# Patient Record
Sex: MALE | Race: BLACK OR AFRICAN AMERICAN | NOT HISPANIC OR LATINO | Employment: UNEMPLOYED | URBAN - METROPOLITAN AREA
[De-identification: names, ages, dates, MRNs, and addresses within clinical notes are randomized per-mention and may not be internally consistent; named-entity substitution may affect disease eponyms.]

---

## 2018-07-03 ENCOUNTER — HOSPITAL ENCOUNTER (EMERGENCY)
Facility: OTHER | Age: 33
Discharge: HOME | End: 2018-07-03
Attending: EMERGENCY MEDICINE
Payer: MEDICAID

## 2018-07-03 VITALS
HEART RATE: 63 BPM | HEIGHT: 73 IN | TEMPERATURE: 98 F | WEIGHT: 150 LBS | RESPIRATION RATE: 18 BRPM | OXYGEN SATURATION: 100 % | DIASTOLIC BLOOD PRESSURE: 62 MMHG | BODY MASS INDEX: 19.88 KG/M2 | SYSTOLIC BLOOD PRESSURE: 113 MMHG

## 2018-07-03 DIAGNOSIS — R07.9 CHEST PAIN: Primary | ICD-10-CM

## 2018-07-03 LAB
ALBUMIN SERPL BCP-MCNC: 4.5 G/DL
ALP SERPL-CCNC: 39 U/L
ALT SERPL W/O P-5'-P-CCNC: 15 U/L
ANION GAP SERPL CALC-SCNC: 8 MMOL/L
ANISOCYTOSIS BLD QL SMEAR: SLIGHT
AST SERPL-CCNC: 16 U/L
BASOPHILS # BLD AUTO: 0.04 K/UL
BASOPHILS NFR BLD: 0.7 %
BILIRUB SERPL-MCNC: 0.6 MG/DL
BUN SERPL-MCNC: 15 MG/DL
CALCIUM SERPL-MCNC: 9.8 MG/DL
CHLORIDE SERPL-SCNC: 106 MMOL/L
CO2 SERPL-SCNC: 27 MMOL/L
CREAT SERPL-MCNC: 1.3 MG/DL
DACRYOCYTES BLD QL SMEAR: ABNORMAL
DIFFERENTIAL METHOD: ABNORMAL
EOSINOPHIL # BLD AUTO: 0.3 K/UL
EOSINOPHIL NFR BLD: 6.2 %
ERYTHROCYTE [DISTWIDTH] IN BLOOD BY AUTOMATED COUNT: 15.1 %
EST. GFR  (AFRICAN AMERICAN): >60 ML/MIN/1.73 M^2
EST. GFR  (NON AFRICAN AMERICAN): >60 ML/MIN/1.73 M^2
GIANT PLATELETS BLD QL SMEAR: PRESENT
GLUCOSE SERPL-MCNC: 106 MG/DL
HCT VFR BLD AUTO: 42.4 %
HGB BLD-MCNC: 13.4 G/DL
LYMPHOCYTES # BLD AUTO: 1.3 K/UL
LYMPHOCYTES NFR BLD: 22.8 %
MCH RBC QN AUTO: 22.9 PG
MCHC RBC AUTO-ENTMCNC: 31.6 G/DL
MCV RBC AUTO: 73 FL
MONOCYTES # BLD AUTO: 0.4 K/UL
MONOCYTES NFR BLD: 6.8 %
NEUTROPHILS # BLD AUTO: 3.5 K/UL
NEUTROPHILS NFR BLD: 63.5 %
PLATELET # BLD AUTO: 137 K/UL
PMV BLD AUTO: ABNORMAL FL
POTASSIUM SERPL-SCNC: 4.5 MMOL/L
PROT SERPL-MCNC: 7.5 G/DL
RBC # BLD AUTO: 5.85 M/UL
SODIUM SERPL-SCNC: 141 MMOL/L
TROPONIN I SERPL DL<=0.01 NG/ML-MCNC: <0.006 NG/ML
WBC # BLD AUTO: 5.48 K/UL

## 2018-07-03 PROCEDURE — 99284 EMERGENCY DEPT VISIT MOD MDM: CPT | Mod: 25

## 2018-07-03 PROCEDURE — 93005 ELECTROCARDIOGRAM TRACING: CPT

## 2018-07-03 PROCEDURE — 93010 ELECTROCARDIOGRAM REPORT: CPT | Mod: ,,, | Performed by: INTERNAL MEDICINE

## 2018-07-03 PROCEDURE — 84484 ASSAY OF TROPONIN QUANT: CPT

## 2018-07-03 PROCEDURE — 25000003 PHARM REV CODE 250: Performed by: PHYSICIAN ASSISTANT

## 2018-07-03 PROCEDURE — 85025 COMPLETE CBC W/AUTO DIFF WBC: CPT

## 2018-07-03 PROCEDURE — 80053 COMPREHEN METABOLIC PANEL: CPT

## 2018-07-03 PROCEDURE — 93010 ELECTROCARDIOGRAM REPORT: CPT | Mod: 59,,, | Performed by: INTERNAL MEDICINE

## 2018-07-03 RX ORDER — NAPROXEN 500 MG/1
500 TABLET ORAL 2 TIMES DAILY WITH MEALS
Qty: 20 TABLET | Refills: 0 | Status: SHIPPED | OUTPATIENT
Start: 2018-07-03 | End: 2020-03-09

## 2018-07-03 RX ORDER — NAPROXEN 500 MG/1
500 TABLET ORAL
Status: COMPLETED | OUTPATIENT
Start: 2018-07-03 | End: 2018-07-03

## 2018-07-03 RX ADMIN — NAPROXEN 500 MG: 500 TABLET ORAL at 03:07

## 2018-07-03 NOTE — ED PROVIDER NOTES
"Encounter Date: 7/3/2018       History     Chief Complaint   Patient presents with    Muscle Pain     "I think I might be having a muscle spasm. It's very nik on the side and into my L shoulder." denies n/v, sob     The patient is a 33-year-old male presenting to the emergency department with complaints of left-sided chest pain. The patient states her symptoms started approximately 12:30 p.m..  He states that he had been napping, and went to the get up.  He states he developed a pain in his left chest.  He denies significant shortness of breath, nausea or vomiting.  He reports that he has had this pain in the past, but is never lasted as long.  He states that approximately 3 hr later to starting to improve.  He also reports significant improvement of his symptoms when he lays down.  He denies taking any medication for symptoms thus far.  He denies a history of hypertension, diabetes, tobacco use, or early family cardiac history.  He does admit that he is stressed out, she has been out of work for the past 3 months. This is the extent of the patient's complaints at this time.         The history is provided by the patient.     Review of patient's allergies indicates:  No Known Allergies  Past Medical History:   Diagnosis Date    Asthma      Past Surgical History:   Procedure Laterality Date    TESTICLE SURGERY       History reviewed. No pertinent family history.  Social History   Substance Use Topics    Smoking status: Never Smoker    Smokeless tobacco: Not on file    Alcohol use Yes      Comment: occasionally     Review of Systems   Constitutional: Negative for activity change, chills, fatigue and fever.   HENT: Negative for congestion, rhinorrhea and sore throat.    Eyes: Negative for photophobia and visual disturbance.   Respiratory: Negative for cough.    Cardiovascular: Positive for chest pain.   Gastrointestinal: Negative for abdominal pain, diarrhea, nausea and vomiting.   Genitourinary: Negative for " dysuria, hematuria and urgency.   Musculoskeletal: Negative for back pain, myalgias and neck pain.   Skin: Negative for color change and wound.   Neurological: Negative for weakness and headaches.   Psychiatric/Behavioral: Negative for agitation and confusion.       Physical Exam     Initial Vitals [07/03/18 1438]   BP Pulse Resp Temp SpO2   132/67 76 16 97.4 °F (36.3 °C) 100 %      MAP       --         Physical Exam    Nursing note and vitals reviewed.  Constitutional: Vital signs are normal. He appears well-developed and well-nourished. He is not diaphoretic.  Non-toxic appearance. He does not have a sickly appearance. He does not appear ill. No distress.   Well-appearing,  male accompanied by his family in the emergency department.  Speaking clearly in full sentences.  No acute distress. Ambulatory without difficulty.   HENT:   Head: Normocephalic and atraumatic.   Right Ear: External ear normal.   Left Ear: External ear normal.   Nose: Nose normal.   Mouth/Throat: Oropharynx is clear and moist.   Eyes: Conjunctivae and EOM are normal.   Neck: Normal range of motion. Neck supple.   Cardiovascular: Normal rate, regular rhythm and normal heart sounds.   Pulmonary/Chest: Breath sounds normal. No respiratory distress. He has no wheezes.   Musculoskeletal: Normal range of motion.   Neurological: He is alert and oriented to person, place, and time.   Skin: Skin is warm.   Psychiatric: He has a normal mood and affect. His behavior is normal. Judgment and thought content normal.         ED Course   Procedures  Labs Reviewed   CBC W/ AUTO DIFFERENTIAL - Abnormal; Notable for the following:        Result Value    Hemoglobin 13.4 (*)     MCV 73 (*)     MCH 22.9 (*)     MCHC 31.6 (*)     RDW 15.1 (*)     Platelets 137 (*)     All other components within normal limits   COMPREHENSIVE METABOLIC PANEL - Abnormal; Notable for the following:     Alkaline Phosphatase 39 (*)     All other components within normal  limits   TROPONIN I     EKG Readings: (Independently Interpreted)   Rhythm: Sinus Arrhythmia. Heart Rate: 62.       Imaging Results          X-Ray Chest PA And Lateral (Final result)  Result time 07/03/18 15:31:42    Final result by Homar Vivar MD (07/03/18 15:31:42)                 Impression:      No acute abnormality.      Electronically signed by: Homar Vivar MD  Date:    07/03/2018  Time:    15:31             Narrative:    EXAMINATION:  XR CHEST PA AND LATERAL    CLINICAL HISTORY:  Chest pain, unspecified    TECHNIQUE:  PA and lateral views of the chest were performed.    COMPARISON:  None    FINDINGS:  The lungs are clear, with normal appearance of pulmonary vasculature and no pleural effusion or pneumothorax.    The cardiomediastinal silhouette is normal.    Bones are intact.                              X-Rays:   Independently Interpreted Readings:   Chest X-Ray: Normal heart size.  No infiltrates.  No acute abnormalities.     Medical Decision Making:   Initial Assessment:   Urgent evaluation of a 33-year-old male presenting to the emergency department with complaints of left-sided chest pain. Patient is afebrile, nontoxic appearing, hemodynamically stable. Physical exam reveals regular rate and rhythm, lungs are clear to auscultation bilaterally. No reproducible tenderness to palpation of the anterior chest wall.  Will plan for labs, EKG, chest x-ray and reassess.  Independently Interpreted Test(s):   I have ordered and independently interpreted X-rays - see prior notes.  I have ordered and independently interpreted EKG Reading(s) - see prior notes  Clinical Tests:   Lab Tests: Ordered and Reviewed  The following lab test(s) were unremarkable: CBC, CMP and Troponin  Radiological Study: Ordered and Reviewed  Medical Tests: Ordered and Reviewed  ED Management:  The EKG shows normal sinus rhythm with a sinus arrhythmia, rate of 62 beats per minute, no STEMI.  Chest x-ray is unremarkable. The CBC shows no  leukocytosis, normal H&H.  CMP is unremarkable. Troponin is negative.  At this time, do not feel that further testing or imaging is warranted.  Patient does not have any signs or symptoms concerning for ACS, nor does he have any risk factors.  He has a negative troponin.  He reports his pain improved to a certain positions, admits had episodes in the past.  Normal vital signs with no EKG changes concerning for ischemia.  Discharged home with a prescription for naproxen, counseled on symptomatic care and treatment.  Stable for discharge. The patient was instructed to follow up with a primary care provider in 2 days or to return to the emergency department for worsening symptoms. The treatment plan was discussed with the patient who demonstrated understanding and comfort with plan. The patient's history, physical exam, and plan of care was discussed with and agreed upon with my supervising physician.    This note was created using M [employer] Fluency Direct. There may be typographical errors secondary to dictation.                       Clinical Impression:     1. Chest pain           Disposition:   Disposition: Discharged  Condition: Stable                       Cammy Cottrell PA-C  07/03/18 [id number]

## 2019-07-09 NOTE — ED TRIAGE NOTES
"Pt presents to the ED with c/o left anterior chest wall discomfort that started around 1 pm. Pt describes discomfort as "tightness". Pt states that when he is upset, he have "the same feeling". Pt states that he had tingling to left arm when cp occurred. Pt states his pain is now a 3/10. Pt denies interventions. Pt denies sob, cough, chills, fever, n/v/d, and dizziness. Pt is AAOX4. Pt in NAD.   " Bladder non-tender and non-distended. Urine clear yellow.

## 2019-10-02 ENCOUNTER — OFFICE VISIT (OUTPATIENT)
Dept: INTERNAL MEDICINE | Facility: CLINIC | Age: 34
End: 2019-10-02
Payer: COMMERCIAL

## 2019-10-02 ENCOUNTER — LAB VISIT (OUTPATIENT)
Dept: LAB | Facility: HOSPITAL | Age: 34
End: 2019-10-02
Payer: COMMERCIAL

## 2019-10-02 VITALS
HEIGHT: 73 IN | HEART RATE: 62 BPM | BODY MASS INDEX: 19.95 KG/M2 | OXYGEN SATURATION: 99 % | DIASTOLIC BLOOD PRESSURE: 76 MMHG | SYSTOLIC BLOOD PRESSURE: 108 MMHG | WEIGHT: 150.56 LBS

## 2019-10-02 DIAGNOSIS — Z00.00 ANNUAL PHYSICAL EXAM: Primary | ICD-10-CM

## 2019-10-02 DIAGNOSIS — Z00.00 ANNUAL PHYSICAL EXAM: ICD-10-CM

## 2019-10-02 LAB
25(OH)D3+25(OH)D2 SERPL-MCNC: 8 NG/ML (ref 30–96)
ALBUMIN SERPL BCP-MCNC: 4.3 G/DL (ref 3.5–5.2)
ALP SERPL-CCNC: 50 U/L (ref 55–135)
ALT SERPL W/O P-5'-P-CCNC: 11 U/L (ref 10–44)
ANION GAP SERPL CALC-SCNC: 7 MMOL/L (ref 8–16)
AST SERPL-CCNC: 12 U/L (ref 10–40)
BASOPHILS # BLD AUTO: 0.05 K/UL (ref 0–0.2)
BASOPHILS NFR BLD: 1 % (ref 0–1.9)
BILIRUB SERPL-MCNC: 0.6 MG/DL (ref 0.1–1)
BILIRUB UR QL STRIP: NEGATIVE
BUN SERPL-MCNC: 13 MG/DL (ref 6–20)
CALCIUM SERPL-MCNC: 9.4 MG/DL (ref 8.7–10.5)
CHLORIDE SERPL-SCNC: 105 MMOL/L (ref 95–110)
CHOLEST SERPL-MCNC: 121 MG/DL (ref 120–199)
CHOLEST/HDLC SERPL: 2.3 {RATIO} (ref 2–5)
CLARITY UR REFRACT.AUTO: CLEAR
CO2 SERPL-SCNC: 28 MMOL/L (ref 23–29)
COLOR UR AUTO: YELLOW
CREAT SERPL-MCNC: 1.2 MG/DL (ref 0.5–1.4)
DIFFERENTIAL METHOD: ABNORMAL
EOSINOPHIL # BLD AUTO: 0.4 K/UL (ref 0–0.5)
EOSINOPHIL NFR BLD: 6.8 % (ref 0–8)
ERYTHROCYTE [DISTWIDTH] IN BLOOD BY AUTOMATED COUNT: 16.2 % (ref 11.5–14.5)
EST. GFR  (AFRICAN AMERICAN): >60 ML/MIN/1.73 M^2
EST. GFR  (NON AFRICAN AMERICAN): >60 ML/MIN/1.73 M^2
GLUCOSE SERPL-MCNC: 97 MG/DL (ref 70–110)
GLUCOSE UR QL STRIP: NEGATIVE
HCT VFR BLD AUTO: 43.2 % (ref 40–54)
HDLC SERPL-MCNC: 52 MG/DL (ref 40–75)
HDLC SERPL: 43 % (ref 20–50)
HGB BLD-MCNC: 12.6 G/DL (ref 14–18)
HGB UR QL STRIP: NEGATIVE
KETONES UR QL STRIP: NEGATIVE
LDLC SERPL CALC-MCNC: 63.2 MG/DL (ref 63–159)
LEUKOCYTE ESTERASE UR QL STRIP: NEGATIVE
LYMPHOCYTES # BLD AUTO: 1.6 K/UL (ref 1–4.8)
LYMPHOCYTES NFR BLD: 30.5 % (ref 18–48)
MCH RBC QN AUTO: 22.7 PG (ref 27–31)
MCHC RBC AUTO-ENTMCNC: 29.2 G/DL (ref 32–36)
MCV RBC AUTO: 78 FL (ref 82–98)
MONOCYTES # BLD AUTO: 0.5 K/UL (ref 0.3–1)
MONOCYTES NFR BLD: 10.1 % (ref 4–15)
NEUTROPHILS # BLD AUTO: 2.6 K/UL (ref 1.8–7.7)
NEUTROPHILS NFR BLD: 51.2 % (ref 38–73)
NITRITE UR QL STRIP: NEGATIVE
NONHDLC SERPL-MCNC: 69 MG/DL
NRBC BLD-RTO: 0 /100 WBC
PH UR STRIP: 8 [PH] (ref 5–8)
PLATELET # BLD AUTO: 134 K/UL (ref 150–350)
PMV BLD AUTO: ABNORMAL FL (ref 9.2–12.9)
POTASSIUM SERPL-SCNC: 4 MMOL/L (ref 3.5–5.1)
PROT SERPL-MCNC: 7.2 G/DL (ref 6–8.4)
PROT UR QL STRIP: NEGATIVE
RBC # BLD AUTO: 5.56 M/UL (ref 4.6–6.2)
SODIUM SERPL-SCNC: 140 MMOL/L (ref 136–145)
SP GR UR STRIP: 1.02 (ref 1–1.03)
TRIGL SERPL-MCNC: 29 MG/DL (ref 30–150)
TSH SERPL DL<=0.005 MIU/L-ACNC: 0.75 UIU/ML (ref 0.4–4)
URN SPEC COLLECT METH UR: NORMAL
WBC # BLD AUTO: 5.15 K/UL (ref 3.9–12.7)

## 2019-10-02 PROCEDURE — 84439 ASSAY OF FREE THYROXINE: CPT

## 2019-10-02 PROCEDURE — 82306 VITAMIN D 25 HYDROXY: CPT

## 2019-10-02 PROCEDURE — 80061 LIPID PANEL: CPT

## 2019-10-02 PROCEDURE — 99385 PR PREVENTIVE VISIT,NEW,18-39: ICD-10-PCS | Mod: S$GLB,,, | Performed by: NURSE PRACTITIONER

## 2019-10-02 PROCEDURE — 84443 ASSAY THYROID STIM HORMONE: CPT

## 2019-10-02 PROCEDURE — 85025 COMPLETE CBC W/AUTO DIFF WBC: CPT

## 2019-10-02 PROCEDURE — 36415 COLL VENOUS BLD VENIPUNCTURE: CPT

## 2019-10-02 PROCEDURE — 99999 PR PBB SHADOW E&M-EST. PATIENT-LVL III: ICD-10-PCS | Mod: PBBFAC,,, | Performed by: NURSE PRACTITIONER

## 2019-10-02 PROCEDURE — 99385 PREV VISIT NEW AGE 18-39: CPT | Mod: S$GLB,,, | Performed by: NURSE PRACTITIONER

## 2019-10-02 PROCEDURE — 99999 PR PBB SHADOW E&M-EST. PATIENT-LVL III: CPT | Mod: PBBFAC,,, | Performed by: NURSE PRACTITIONER

## 2019-10-02 PROCEDURE — 81003 URINALYSIS AUTO W/O SCOPE: CPT

## 2019-10-02 PROCEDURE — 80053 COMPREHEN METABOLIC PANEL: CPT

## 2019-10-02 NOTE — PROGRESS NOTES
"Internal Medicine Annual Exam       CHIEF COMPLAINT     The patient, Nirmal Owens, who is a 34 y.o. male presents for an annual exam.    HPI   Here for annual physical.   Needs PCP     Reports "flikering" in the left peripheral vision x 3-5 minutes. Occurred 2-3 weeks ago after waking and while looking at phone. No lasting vision changes. Left eye watering occasionally. No formal eye exam but vision testing through employer normal 6 months ago   1 year ago - concrete debris to the left eye - rinses eye was not seen by medical provider      Also with change in sleep patterns. 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     HM-   Flu - refuses       Past Medical History:  Past Medical History:   Diagnosis Date    Asthma        Past Surgical History:   Procedure Laterality Date    TESTICLE SURGERY          No family history on file.     Social History     Socioeconomic History    Marital status: Single     Spouse name: Not on file    Number of children: Not on file    Years of education: Not on file    Highest education level: Not on file   Occupational History    Not on file   Social Needs    Financial resource strain: Not on file    Food insecurity:     Worry: Not on file     Inability: Not on file    Transportation needs:     Medical: Not on file     Non-medical: Not on file   Tobacco Use    Smoking status: Never Smoker   Substance and Sexual Activity    Alcohol use: Yes     Comment: occasionally    Drug use: No    Sexual activity: Not on file   Lifestyle    Physical activity:     Days per week: Not on file     Minutes per session: Not on file    Stress: Not on file   Relationships    Social connections:     Talks on phone: Not on file     Gets together: Not on file     Attends Christianity service: Not on file     Active member of club or organization: Not on file     Attends meetings of clubs or organizations: Not on file     Relationship status: Not on file   Other Topics Concern    Not on file   Social " "History Narrative    Not on file        Social History     Tobacco Use   Smoking Status Never Smoker        Allergies as of 10/02/2019    (No Known Allergies)          Home Medications:  Prior to Admission medications    Medication Sig Start Date End Date Taking? Authorizing Provider   fluticasone (FLONASE) 50 mcg/actuation nasal spray 1 spray by Each Nare route 2 (two) times daily as needed for Rhinitis. 11/20/16   SisiDONYA Oates   loratadine (CLARITIN) 10 mg tablet Take 1 tablet (10 mg total) by mouth once daily. 11/20/16 11/20/17  Sisi ZACKARY DolanP   naproxen (NAPROSYN) 500 MG tablet Take 1 tablet (500 mg total) by mouth 2 (two) times daily with meals. 7/3/18   Cammy Cottrell PA-C       Review of Systems:  Review of Systems   Constitutional: Positive for activity change and fatigue. Negative for chills, diaphoresis and fever.   HENT: Positive for postnasal drip. Negative for congestion, rhinorrhea, sinus pressure, sinus pain and sore throat.    Eyes: Positive for discharge. Negative for photophobia, pain, redness, itching and visual disturbance.   Respiratory: Negative for cough, shortness of breath and wheezing.    Cardiovascular: Negative for chest pain, palpitations and leg swelling.   Gastrointestinal: Negative for abdominal pain, constipation, diarrhea, nausea and vomiting.   Genitourinary: Negative for difficulty urinating and frequency.   Musculoskeletal: Negative for arthralgias and myalgias.   Neurological: Negative for dizziness, light-headedness and headaches.   Psychiatric/Behavioral: Positive for sleep disturbance.       Health Maintainence:   Immunizations:  Health Maintenance       Date Due Completion Date    Lipid Panel 1986 ---    TETANUS VACCINE 01/13/2004 ---    Influenza Vaccine (1) 09/01/2019 ---           PHYSICAL EXAM     /76 (BP Location: Right arm, Patient Position: Sitting, BP Method: Large (Manual))   Pulse 62   Ht 6' 1" (1.854 m)   Wt 68.3 kg " (150 lb 9.2 oz)   SpO2 99%   BMI 19.87 kg/m²  Body mass index is 19.87 kg/m².    Physical Exam   Constitutional: He is oriented to person, place, and time. He appears well-developed and well-nourished.   HENT:   Head: Normocephalic.   Right Ear: External ear normal.   Left Ear: External ear normal.   Nose: Nose normal.   Mouth/Throat: Oropharynx is clear and moist. No oropharyngeal exudate.   Eyes: Pupils are equal, round, and reactive to light. Conjunctivae, EOM and lids are normal. Right eye exhibits no chemosis, no discharge, no exudate and no hordeolum. No foreign body present in the right eye. Left eye exhibits no chemosis, no discharge, no exudate and no hordeolum. No foreign body present in the left eye. No scleral icterus.   Fundoscopic exam:       The right eye shows no exudate, no hemorrhage and no papilledema. The right eye shows red reflex.        The left eye shows no exudate, no hemorrhage and no papilledema. The left eye shows red reflex.   Peripheral visual fields intact    Neck: No JVD present. No tracheal deviation present. No thyromegaly present.   Cardiovascular: Normal rate, regular rhythm and intact distal pulses. Exam reveals no gallop and no friction rub.   No murmur heard.  Pulmonary/Chest: Breath sounds normal. No respiratory distress. He has no wheezes. He has no rales. He exhibits no tenderness.   Abdominal: Soft. Bowel sounds are normal. He exhibits no distension. There is no tenderness.   Musculoskeletal: Normal range of motion. He exhibits no edema or tenderness.   Lymphadenopathy:     He has no cervical adenopathy.   Neurological: He is alert and oriented to person, place, and time.   Skin: Skin is warm and dry. No rash noted.   Psychiatric: He has a normal mood and affect. His behavior is normal.   Vitals reviewed.      LABS     No results found for: LABA1C, HGBA1C  CMP  Sodium   Date Value Ref Range Status   07/03/2018 141 136 - 145 mmol/L Final     Potassium   Date Value Ref  Range Status   07/03/2018 4.5 3.5 - 5.1 mmol/L Final     Chloride   Date Value Ref Range Status   07/03/2018 106 95 - 110 mmol/L Final     CO2   Date Value Ref Range Status   07/03/2018 27 23 - 29 mmol/L Final     Glucose   Date Value Ref Range Status   07/03/2018 106 70 - 110 mg/dL Final     BUN, Bld   Date Value Ref Range Status   07/03/2018 15 6 - 20 mg/dL Final     Creatinine   Date Value Ref Range Status   07/03/2018 1.3 0.5 - 1.4 mg/dL Final     Calcium   Date Value Ref Range Status   07/03/2018 9.8 8.7 - 10.5 mg/dL Final     Total Protein   Date Value Ref Range Status   07/03/2018 7.5 6.0 - 8.4 g/dL Final     Albumin   Date Value Ref Range Status   07/03/2018 4.5 3.5 - 5.2 g/dL Final     Total Bilirubin   Date Value Ref Range Status   07/03/2018 0.6 0.1 - 1.0 mg/dL Final     Comment:     For infants and newborns, interpretation of results should be based  on gestational age, weight and in agreement with clinical  observations.  Premature Infant recommended reference ranges:  Up to 24 hours.............<8.0 mg/dL  Up to 48 hours............<12.0 mg/dL  3-5 days..................<15.0 mg/dL  6-29 days.................<15.0 mg/dL       Alkaline Phosphatase   Date Value Ref Range Status   07/03/2018 39 (L) 55 - 135 U/L Final     AST   Date Value Ref Range Status   07/03/2018 16 10 - 40 U/L Final     ALT   Date Value Ref Range Status   07/03/2018 15 10 - 44 U/L Final     Anion Gap   Date Value Ref Range Status   07/03/2018 8 8 - 16 mmol/L Final     eGFR if    Date Value Ref Range Status   07/03/2018 >60 >60 mL/min/1.73 m^2 Final     eGFR if non    Date Value Ref Range Status   07/03/2018 >60 >60 mL/min/1.73 m^2 Final     Comment:     Calculation used to obtain the estimated glomerular filtration  rate (eGFR) is the CKD-EPI equation.        Lab Results   Component Value Date    WBC 5.48 07/03/2018    HGB 13.4 (L) 07/03/2018    HCT 42.4 07/03/2018    MCV 73 (L) 07/03/2018      (L) 07/03/2018     No results found for: CHOL  No results found for: HDL  No results found for: LDLCALC  No results found for: TRIG  No results found for: CHOLHDL  Lab Results   Component Value Date    TSH 0.942 09/17/2011       ASSESSMENT/PLAN     Nirmal Owens is a 34 y.o. male with  Past Medical History:   Diagnosis Date    Asthma      Annual physical exam- all age and gender related screenings discussed. Recommended eye exam annually.   -     CBC auto differential; Future; Expected date: 10/02/2019  -     Comprehensive metabolic panel; Future; Expected date: 10/02/2019  -     TSH; Future; Expected date: 10/02/2019  -     T4, free; Future; Expected date: 10/02/2019  -     Urinalysis  -     Vitamin D; Future; Expected date: 10/02/2019  -     Lipid panel; Future; Expected date: 10/02/2019    Follow up with PCP in 4-6 months to establish care     Gaby LOOMIS, APRN, FNP-c   Department of Internal Medicine - [facility]  11:07 AM

## 2019-10-03 LAB — T4 FREE SERPL-MCNC: 0.91 NG/DL (ref 0.71–1.51)

## 2019-12-11 ENCOUNTER — LAB VISIT (OUTPATIENT)
Dept: LAB | Facility: HOSPITAL | Age: 34
End: 2019-12-11
Attending: NURSE PRACTITIONER
Payer: COMMERCIAL

## 2019-12-11 ENCOUNTER — OFFICE VISIT (OUTPATIENT)
Dept: INTERNAL MEDICINE | Facility: CLINIC | Age: 34
End: 2019-12-11
Payer: COMMERCIAL

## 2019-12-11 VITALS
WEIGHT: 154.31 LBS | SYSTOLIC BLOOD PRESSURE: 114 MMHG | HEART RATE: 66 BPM | OXYGEN SATURATION: 99 % | BODY MASS INDEX: 20.45 KG/M2 | DIASTOLIC BLOOD PRESSURE: 72 MMHG | HEIGHT: 73 IN

## 2019-12-11 DIAGNOSIS — K92.1 BLOOD IN STOOL: ICD-10-CM

## 2019-12-11 DIAGNOSIS — R10.13 MIDEPIGASTRIC PAIN: ICD-10-CM

## 2019-12-11 DIAGNOSIS — D64.9 ANEMIA, UNSPECIFIED TYPE: ICD-10-CM

## 2019-12-11 DIAGNOSIS — K29.70 GASTRITIS, PRESENCE OF BLEEDING UNSPECIFIED, UNSPECIFIED CHRONICITY, UNSPECIFIED GASTRITIS TYPE: Primary | ICD-10-CM

## 2019-12-11 LAB
AMYLASE SERPL-CCNC: 68 U/L (ref 20–110)
BASOPHILS # BLD AUTO: 0.03 K/UL (ref 0–0.2)
BASOPHILS NFR BLD: 0.6 % (ref 0–1.9)
DIFFERENTIAL METHOD: ABNORMAL
EOSINOPHIL # BLD AUTO: 0.4 K/UL (ref 0–0.5)
EOSINOPHIL NFR BLD: 8.4 % (ref 0–8)
ERYTHROCYTE [DISTWIDTH] IN BLOOD BY AUTOMATED COUNT: 16.1 % (ref 11.5–14.5)
HCT VFR BLD AUTO: 40.9 % (ref 40–54)
HGB BLD-MCNC: 13 G/DL (ref 14–18)
LIPASE SERPL-CCNC: 27 U/L (ref 4–60)
LYMPHOCYTES # BLD AUTO: 1.5 K/UL (ref 1–4.8)
LYMPHOCYTES NFR BLD: 30 % (ref 18–48)
MCH RBC QN AUTO: 22.9 PG (ref 27–31)
MCHC RBC AUTO-ENTMCNC: 31.8 G/DL (ref 32–36)
MCV RBC AUTO: 72 FL (ref 82–98)
MONOCYTES # BLD AUTO: 0.6 K/UL (ref 0.3–1)
MONOCYTES NFR BLD: 11.3 % (ref 4–15)
NEUTROPHILS # BLD AUTO: 2.4 K/UL (ref 1.8–7.7)
NEUTROPHILS NFR BLD: 49.7 % (ref 38–73)
PLATELET # BLD AUTO: 165 K/UL (ref 150–350)
PMV BLD AUTO: ABNORMAL FL (ref 9.2–12.9)
RBC # BLD AUTO: 5.67 M/UL (ref 4.6–6.2)
WBC # BLD AUTO: 4.87 K/UL (ref 3.9–12.7)

## 2019-12-11 PROCEDURE — 82150 ASSAY OF AMYLASE: CPT

## 2019-12-11 PROCEDURE — 99999 PR PBB SHADOW E&M-EST. PATIENT-LVL III: ICD-10-PCS | Mod: PBBFAC,,, | Performed by: NURSE PRACTITIONER

## 2019-12-11 PROCEDURE — 85025 COMPLETE CBC W/AUTO DIFF WBC: CPT

## 2019-12-11 PROCEDURE — 3008F BODY MASS INDEX DOCD: CPT | Mod: CPTII,S$GLB,, | Performed by: NURSE PRACTITIONER

## 2019-12-11 PROCEDURE — 36415 COLL VENOUS BLD VENIPUNCTURE: CPT

## 2019-12-11 PROCEDURE — 83690 ASSAY OF LIPASE: CPT

## 2019-12-11 PROCEDURE — 99214 PR OFFICE/OUTPT VISIT, EST, LEVL IV, 30-39 MIN: ICD-10-PCS | Mod: S$GLB,,, | Performed by: NURSE PRACTITIONER

## 2019-12-11 PROCEDURE — 3008F PR BODY MASS INDEX (BMI) DOCUMENTED: ICD-10-PCS | Mod: CPTII,S$GLB,, | Performed by: NURSE PRACTITIONER

## 2019-12-11 PROCEDURE — 99999 PR PBB SHADOW E&M-EST. PATIENT-LVL III: CPT | Mod: PBBFAC,,, | Performed by: NURSE PRACTITIONER

## 2019-12-11 PROCEDURE — 99214 OFFICE O/P EST MOD 30 MIN: CPT | Mod: S$GLB,,, | Performed by: NURSE PRACTITIONER

## 2019-12-11 RX ORDER — OMEPRAZOLE 40 MG/1
40 CAPSULE, DELAYED RELEASE ORAL DAILY
Qty: 30 CAPSULE | Refills: 1 | Status: SHIPPED | OUTPATIENT
Start: 2019-12-11 | End: 2021-02-05

## 2019-12-11 NOTE — PROGRESS NOTES
"INTERNAL MEDICINE URGENT CARE NOTE    CHIEF COMPLAINT     Chief Complaint   Patient presents with    Heartburn     x1 month       HPI     Nirmal Owens is a 34 y.o. male who presents for an urgent visit today.    Here with c/o heartburn x 1 month - describes burning to the mid-epigastric region when lying down and after eating. Started immediately after eating chinese food. One episode of diarrhea following the episode with "speck" of blood in stool. Since then BMs normal. No blood in stool.    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     Pain described as burning to the epigastric region worse after drinking coffee this past Sunday.       Past Medical History:  Past Medical History:   Diagnosis Date    Asthma        Home Medications:  Prior to Admission medications    Medication Sig Start Date End Date Taking? Authorizing Provider   fluticasone (FLONASE) 50 mcg/actuation nasal spray 1 spray by Each Nare route 2 (two) times daily as needed for Rhinitis.  Patient not taking: Reported on 12/11/2019 11/20/16   DONYA Rodriguez   loratadine (CLARITIN) 10 mg tablet Take 1 tablet (10 mg total) by mouth once daily. 11/20/16 11/20/17  DONYA Rodriguez   naproxen (NAPROSYN) 500 MG tablet Take 1 tablet (500 mg total) by mouth 2 (two) times daily with meals.  Patient not taking: Reported on 12/11/2019 7/3/18   Cammy Cottrell PA-C       Review of Systems:  Review of Systems   Constitutional: Negative for activity change and unexpected weight change.   HENT: Negative for hearing loss, rhinorrhea and trouble swallowing.    Eyes: Negative for discharge and visual disturbance.   Respiratory: Negative for chest tightness and wheezing.    Cardiovascular: Negative for chest pain and palpitations.   Gastrointestinal: Positive for abdominal pain, blood in stool and diarrhea. Negative for constipation and vomiting.   Endocrine: Negative for polydipsia and polyuria.   Genitourinary: " "Negative for difficulty urinating, hematuria and urgency.   Musculoskeletal: Negative for arthralgias, joint swelling and neck pain.   Neurological: Negative for weakness and headaches.   Psychiatric/Behavioral: Negative for confusion and dysphoric mood.       Health Maintainence:   Immunizations:  Health Maintenance       Date Due Completion Date    TETANUS VACCINE 01/13/2004 ---    Influenza Vaccine (1) 10/01/2020 (Originally 9/1/2019) ---           PHYSICAL EXAM     /72 (BP Location: Right arm, Patient Position: Sitting, BP Method: Large (Manual))   Pulse 66   Ht 6' 1" (1.854 m)   Wt 70 kg (154 lb 5.2 oz)   SpO2 99%   BMI 20.36 kg/m²     Physical Exam   Constitutional: He is oriented to person, place, and time. He appears well-developed and well-nourished.   HENT:   Head: Normocephalic.   Right Ear: External ear normal.   Left Ear: External ear normal.   Nose: Nose normal.   Mouth/Throat: Oropharynx is clear and moist. No oropharyngeal exudate.   Eyes: Pupils are equal, round, and reactive to light.   Neck: No JVD present. No tracheal deviation present. No thyromegaly present.   Cardiovascular: Normal rate, regular rhythm and intact distal pulses. Exam reveals no gallop and no friction rub.   No murmur heard.  Pulmonary/Chest: Breath sounds normal. No respiratory distress. He has no wheezes. He has no rales. He exhibits no tenderness.   Abdominal: Soft. Bowel sounds are normal. He exhibits no distension. There is tenderness (epigastric ).   Musculoskeletal: Normal range of motion. He exhibits no edema or tenderness.   Lymphadenopathy:     He has no cervical adenopathy.   Neurological: He is alert and oriented to person, place, and time.   Skin: Skin is warm and dry. No rash noted.   Psychiatric: He has a normal mood and affect. His behavior is normal.   Vitals reviewed.      LABS     No results found for: LABA1C, HGBA1C  CMP  Sodium   Date Value Ref Range Status   10/02/2019 140 136 - 145 mmol/L Final "     Potassium   Date Value Ref Range Status   10/02/2019 4.0 3.5 - 5.1 mmol/L Final     Chloride   Date Value Ref Range Status   10/02/2019 105 95 - 110 mmol/L Final     CO2   Date Value Ref Range Status   10/02/2019 28 23 - 29 mmol/L Final     Glucose   Date Value Ref Range Status   10/02/2019 97 70 - 110 mg/dL Final     BUN, Bld   Date Value Ref Range Status   10/02/2019 13 6 - 20 mg/dL Final     Creatinine   Date Value Ref Range Status   10/02/2019 1.2 0.5 - 1.4 mg/dL Final     Calcium   Date Value Ref Range Status   10/02/2019 9.4 8.7 - 10.5 mg/dL Final     Total Protein   Date Value Ref Range Status   10/02/2019 7.2 6.0 - 8.4 g/dL Final     Albumin   Date Value Ref Range Status   10/02/2019 4.3 3.5 - 5.2 g/dL Final     Total Bilirubin   Date Value Ref Range Status   10/02/2019 0.6 0.1 - 1.0 mg/dL Final     Comment:     For infants and newborns, interpretation of results should be based  on gestational age, weight and in agreement with clinical  observations.  Premature Infant recommended reference ranges:  Up to 24 hours.............<8.0 mg/dL  Up to 48 hours............<12.0 mg/dL  3-5 days..................<15.0 mg/dL  6-29 days.................<15.0 mg/dL       Alkaline Phosphatase   Date Value Ref Range Status   10/02/2019 50 (L) 55 - 135 U/L Final     AST   Date Value Ref Range Status   10/02/2019 12 10 - 40 U/L Final     ALT   Date Value Ref Range Status   10/02/2019 11 10 - 44 U/L Final     Anion Gap   Date Value Ref Range Status   10/02/2019 7 (L) 8 - 16 mmol/L Final     eGFR if    Date Value Ref Range Status   10/02/2019 >60 >60 mL/min/1.73 m^2 Final     eGFR if non    Date Value Ref Range Status   10/02/2019 >60 >60 mL/min/1.73 m^2 Final     Comment:     Calculation used to obtain the estimated glomerular filtration  rate (eGFR) is the CKD-EPI equation.        Lab Results   Component Value Date    WBC 5.15 10/02/2019    HGB 12.6 (L) 10/02/2019    HCT 43.2 10/02/2019     MCV 78 (L) 10/02/2019     (L) 10/02/2019     Lab Results   Component Value Date    CHOL 121 10/02/2019     Lab Results   Component Value Date    HDL 52 10/02/2019     Lab Results   Component Value Date    LDLCALC 63.2 10/02/2019     Lab Results   Component Value Date    TRIG 29 (L) 10/02/2019     Lab Results   Component Value Date    CHOLHDL 43.0 10/02/2019     Lab Results   Component Value Date    TSH 0.746 10/02/2019       ASSESSMENT/PLAN     Nirmal Oewns is a 34 y.o. male with  Past Medical History:   Diagnosis Date    Asthma      Gastritis, presence of bleeding unspecified, unspecified chronicity, unspecified gastritis type- will start prilosec daily x 3 weeks then wean. discussed dietary changes and lifestyle changes.   -     omeprazole (PRILOSEC) 40 MG capsule; Take 1 capsule (40 mg total) by mouth once daily.  Dispense: 30 capsule; Refill: 1    Blood in stool- send for FOBT   -     Occult blood x 1, stool; Future; Expected date: 12/11/2019    Anemia, unspecified type  -     CBC auto differential; Future; Expected date: 12/11/2019    Midepigastric pain  -     Lipase; Future; Expected date: 12/11/2019  -     Amylase; Future; Expected date: 12/11/2019    Time spent in counseling and education - 20 minutes of 30 minute appointment     Follow up with PCP    Patient education provided from [facility]. Patient was counseled on when and how to seek emergent care.       Gaby LOOMIS, APRN, FNP-c   Department of Internal Medicine - [facility]  8:36 AM

## 2019-12-11 NOTE — LETTER
December 11, 2019      [facility] - [facility]  [address]  [facility] [address]  Phone: [phone]  Fax: [phone]       Patient: Nirmal Owens   YOB: 1985  Date of Visit: 12/11/2019    To Whom It May Concern:    Sharonda Owens  was at [facility] on 12/11/2019. He may return to work on Friday December 13, 2019 with no restrictions. If you have any questions or concerns, or if I can be of further assistance, please do not hesitate to contact me.    Sincerely,    Gaby Burgess NP

## 2019-12-11 NOTE — PATIENT INSTRUCTIONS
Tae omeprazole 40mg daily x 3 weeks, then decrease to every other day x 2 weeks then stop   See GERD food list

## 2020-03-09 ENCOUNTER — OFFICE VISIT (OUTPATIENT)
Dept: INTERNAL MEDICINE | Facility: CLINIC | Age: 35
End: 2020-03-09
Payer: COMMERCIAL

## 2020-03-09 ENCOUNTER — LAB VISIT (OUTPATIENT)
Dept: LAB | Facility: HOSPITAL | Age: 35
End: 2020-03-09
Attending: INTERNAL MEDICINE
Payer: COMMERCIAL

## 2020-03-09 VITALS
HEART RATE: 66 BPM | WEIGHT: 154.13 LBS | OXYGEN SATURATION: 99 % | BODY MASS INDEX: 20.43 KG/M2 | DIASTOLIC BLOOD PRESSURE: 62 MMHG | SYSTOLIC BLOOD PRESSURE: 122 MMHG | HEIGHT: 73 IN

## 2020-03-09 DIAGNOSIS — Z00.00 ROUTINE PHYSICAL EXAMINATION: Primary | ICD-10-CM

## 2020-03-09 DIAGNOSIS — Z11.3 SCREEN FOR STD (SEXUALLY TRANSMITTED DISEASE): ICD-10-CM

## 2020-03-09 DIAGNOSIS — D57.3 SICKLE CELL TRAIT: ICD-10-CM

## 2020-03-09 DIAGNOSIS — E55.9 VITAMIN D DEFICIENCY: ICD-10-CM

## 2020-03-09 DIAGNOSIS — K29.70 GASTRITIS, PRESENCE OF BLEEDING UNSPECIFIED, UNSPECIFIED CHRONICITY, UNSPECIFIED GASTRITIS TYPE: ICD-10-CM

## 2020-03-09 PROCEDURE — 99395 PR PREVENTIVE VISIT,EST,18-39: ICD-10-PCS | Mod: S$GLB,,, | Performed by: INTERNAL MEDICINE

## 2020-03-09 PROCEDURE — 99999 PR PBB SHADOW E&M-EST. PATIENT-LVL III: ICD-10-PCS | Mod: PBBFAC,,, | Performed by: INTERNAL MEDICINE

## 2020-03-09 PROCEDURE — 99395 PREV VISIT EST AGE 18-39: CPT | Mod: S$GLB,,, | Performed by: INTERNAL MEDICINE

## 2020-03-09 PROCEDURE — 87491 CHLMYD TRACH DNA AMP PROBE: CPT

## 2020-03-09 PROCEDURE — 99999 PR PBB SHADOW E&M-EST. PATIENT-LVL III: CPT | Mod: PBBFAC,,, | Performed by: INTERNAL MEDICINE

## 2020-03-09 NOTE — PROGRESS NOTES
Subjective:       Patient ID: Nirmal Owens is a 35 y.o. male.    Chief Complaint: Establish Care    Patient seen for a physical a few months ago by Gaby Reyes.  Labs were fairly stable at that time.  Patient was having some gastroenteritis seemingly related to a change in diet.  That has actually improved quite a bit and he is feeling much better.  He denies any GI or  complaints.  He would like STD testing and we will assist him with that.  He exercises regularly.  He works for local [employer].  He tells me his dad has sickle cell trait any inherited this.  Blood counts have been close to normal however.    Review of Systems   Constitutional: Negative for activity change, chills, fatigue, fever and unexpected weight change.   HENT: Negative for hearing loss, nosebleeds, rhinorrhea and trouble swallowing.    Eyes: Negative for pain, discharge and visual disturbance.   Respiratory: Negative for cough, chest tightness, shortness of breath and wheezing.    Cardiovascular: Negative for chest pain and palpitations.   Gastrointestinal: Negative for abdominal pain, blood in stool, constipation, diarrhea, nausea and vomiting.   Endocrine: Negative for polydipsia and polyuria.   Genitourinary: Negative for difficulty urinating, hematuria and urgency.   Musculoskeletal: Negative for arthralgias, joint swelling and neck pain.   Skin: Negative for rash.   Neurological: Negative for dizziness, weakness and headaches.   Hematological: Does not bruise/bleed easily.   Psychiatric/Behavioral: Negative for confusion, dysphoric mood, sleep disturbance and suicidal ideas.       Objective:      Physical Exam   Constitutional: He is oriented to person, place, and time. He appears well-developed and well-nourished. No distress.   HENT:   Head: Normocephalic and atraumatic.   Right Ear: External ear normal.   Left Ear: External ear normal.   Mouth/Throat: Oropharynx is clear and moist. No oropharyngeal exudate.   TM's  clear, pharynx clear   Eyes: Pupils are equal, round, and reactive to light. Conjunctivae and EOM are normal. No scleral icterus.   Neck: Normal range of motion. Neck supple. No thyromegaly present.   No supraclavicular nodes palpated   Cardiovascular: Normal rate, regular rhythm and normal heart sounds.   No murmur heard.  Pulmonary/Chest: Effort normal and breath sounds normal. He has no wheezes.   Abdominal: Soft. Bowel sounds are normal. He exhibits no mass. There is no tenderness.   Musculoskeletal: He exhibits no edema.   Lymphadenopathy:     He has no cervical adenopathy.   Neurological: He is alert and oriented to person, place, and time.   Skin: No rash noted. No erythema. No pallor.   Psychiatric: He has a normal mood and affect. His behavior is normal.       Assessment:       1. Routine physical examination    2. Vitamin D deficiency    3. Gastritis, presence of bleeding unspecified, unspecified chronicity, unspecified gastritis type    4. Sickle cell trait    5. Screen for STD (sexually transmitted disease)        Plan:       Nirmal was seen today for establish care.    Diagnoses and all orders for this visit:    Routine physical examination    Vitamin D deficiency    Gastritis, presence of bleeding unspecified, unspecified chronicity, unspecified gastritis type  Comments:  Was related to change in diet but resolved now.     Sickle cell trait    Screen for STD (sexually transmitted disease)  -     RPR; Future  -     HIV 1/2 Ag/Ab (4th Gen); Future  -     C. trachomatis/N. gonorrhoeae by AMP DNA; Future        review results, follow-up annually.

## 2020-03-10 LAB
C TRACH DNA SPEC QL NAA+PROBE: NOT DETECTED
N GONORRHOEA DNA SPEC QL NAA+PROBE: NOT DETECTED

## 2020-04-03 ENCOUNTER — OFFICE VISIT (OUTPATIENT)
Dept: URGENT CARE | Facility: CLINIC | Age: 35
End: 2020-04-03
Payer: COMMERCIAL

## 2020-04-03 VITALS
SYSTOLIC BLOOD PRESSURE: 132 MMHG | TEMPERATURE: 98 F | WEIGHT: 154 LBS | BODY MASS INDEX: 20.41 KG/M2 | HEART RATE: 81 BPM | OXYGEN SATURATION: 98 % | DIASTOLIC BLOOD PRESSURE: 75 MMHG | HEIGHT: 73 IN

## 2020-04-03 DIAGNOSIS — B34.9 ACUTE VIRAL SYNDROME: Primary | ICD-10-CM

## 2020-04-03 PROCEDURE — 99214 PR OFFICE/OUTPT VISIT, EST, LEVL IV, 30-39 MIN: ICD-10-PCS | Mod: S$GLB,,, | Performed by: EMERGENCY MEDICINE

## 2020-04-03 PROCEDURE — 99214 OFFICE O/P EST MOD 30 MIN: CPT | Mod: S$GLB,,, | Performed by: EMERGENCY MEDICINE

## 2020-04-03 NOTE — PATIENT INSTRUCTIONS
At this time you do not meet criteria for coronavirus testing.  This does not mean that you dont have coronavirus.  Is simply means that we are unable to test you at this time, and we recommend that you do not have contact with immune compromised persons, pregnant women,  or people in the extremes of age.  Please use good handwashing practices.  If you have symptoms of cough, fever, congestion we recommend that you stay at home on self-quarantine for a minimum of 7 days and until your symptoms and fever have resolved for 48 hours.       Over the Counter Medications for upper respiratory infection and viral syndrome:    1. Ibuprofen 800 mg every 8 hours. May alternate with tylenol 1000 mg every 4 hours. (Do not take tylenol with other sources of acetaminophen such as Dayquil)  2. Zyrtec or Claritin 10 mg daily  3. Delsym or Dayquil for cough and congestion  4. Flonase for congestion and sinus pressure    Please call your primary care doctor or telemedicine if your symptoms worsen.  You may need to speak to your physician regarding whether you require further evaluation in the emergency room.

## 2020-04-03 NOTE — LETTER
April 3, 2020      [facility] [address] [place] [address]  Phone: [phone]  Fax: [phone]       Patient: Nirmal Owens   YOB: 1985  Date of Visit: 04/03/2020    To Whom It May Concern:    hSaronda Owens  was at [facility] on 04/03/2020.      At this time this patient does not meet criteria for coronavirus testing per CDC guidelines.  Symptomatic patients should be self-quarantined for a minimum of 7 days after the beginning of symptoms and until the patient shows no symptoms and has not had a fever for 72 hours.      Sincerely,    Najma Jarvis MD

## 2020-04-03 NOTE — PROGRESS NOTES
"[facility] - Visit Note                                           Chief Complaint  35 y.o. male with Shortness of Breath    History of Present Illness  Nirmal Owens presents to the urgent care with complaints of mild shortness of breath, diarrhea and fatigue.  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  He has not had fever.  He generally feels well.  He is concerned about corona virus.  He has sickle cell trait but not disease.  This visit was conducted remotely per [facility] protocol    Past Medical History:   Diagnosis Date    Asthma      Past Surgical History:   Procedure Laterality Date    TESTICLE SURGERY        Review of patient's allergies indicates:  No Known Allergies     Review of Systems and Physical Exam     Review of Systems  -- Constitution - no fever, no weight loss, no loss of consciousness reports fatigue  -- Eyes - no changes in vision, no redness, no swelling, no discharge  -- Ear, Nose - no  earache, no loss of hearing, no epistaxis  -- Mouth,Throat - no sore throat, no toothache, normal voice, normal swallowing  -- Respiratory - denies cough and congestion, reports mild shortness of breath, no wheezing, no increased WOB   -- Cardiovascular - denies chest pain, no palpitations, no lower extremity edema  -- Gastrointestinal - denies abdominal pain, denies nausea, vomiting, reports diarrhea  -- Genitourinary - no dysuria, denies flank pain, no hematuria or frequency   -- Musculoskeletal - denies back pain, negative for myalgias and arthralgias   -- Neurological - no headache, no neurologic changes, no loss of bladder or bowel function no seizure like activity, no changes in hearing or vision  -- Skin - denies skin changes, no rash, no hives, no suspected skin infection    Vital Signs   height is 6' 1" (1.854 m) and weight is 69.9 kg (154 lb). His temperature is 98.4 °F (36.9 °C). His blood pressure is 132/75 and his pulse is 81. His oxygen saturation is 98%.      Physical Exam not " conducted, vital signs stable within normal limits, patient sounds well on the phone with no obvious increased work of breathing heard while talking    Treatment Course, Evaluation, and Medical Decision Makin.  Physical exam not conducted  2.  Patient does not meet criteria for chronic virus testing  3.  Discharge home with self quarantine precautions      Diagnosis  -- acute viral syndrome    Disposition and Plan  -- Disposition: home  -- Condition: stable  -- Follow-up: Patient to follow up with Primary Doctor No in 1-2 days, and any specialists noted on discharge paperwork  -- I advised the patient that we have found no life threatening condition today and have provided recommendations his/her care  -- At this time, I believe the patient is clinically stable for discharge.   -- The patient acknowledges that ongoing follow up with a MD is required   -- Patient agrees to comply with all instruction and direction given in the urgent care  -- Patient counseled on strict return precautions as discussed

## 2020-05-18 ENCOUNTER — HOSPITAL ENCOUNTER (OUTPATIENT)
Dept: RADIOLOGY | Facility: HOSPITAL | Age: 35
Discharge: HOME | End: 2020-05-18
Attending: INTERNAL MEDICINE
Payer: COMMERCIAL

## 2020-05-18 ENCOUNTER — OFFICE VISIT (OUTPATIENT)
Dept: INTERNAL MEDICINE | Facility: CLINIC | Age: 35
End: 2020-05-18
Payer: COMMERCIAL

## 2020-05-18 VITALS
HEART RATE: 61 BPM | BODY MASS INDEX: 19.63 KG/M2 | SYSTOLIC BLOOD PRESSURE: 110 MMHG | OXYGEN SATURATION: 96 % | DIASTOLIC BLOOD PRESSURE: 82 MMHG | WEIGHT: 148.81 LBS

## 2020-05-18 DIAGNOSIS — J18.9 PNEUMONIA DUE TO INFECTIOUS ORGANISM, UNSPECIFIED LATERALITY, UNSPECIFIED PART OF LUNG: Primary | ICD-10-CM

## 2020-05-18 DIAGNOSIS — J18.9 PNEUMONIA DUE TO INFECTIOUS ORGANISM, UNSPECIFIED LATERALITY, UNSPECIFIED PART OF LUNG: ICD-10-CM

## 2020-05-18 PROCEDURE — 71046 X-RAY EXAM CHEST 2 VIEWS: CPT | Mod: TC

## 2020-05-18 PROCEDURE — 3008F PR BODY MASS INDEX (BMI) DOCUMENTED: ICD-10-PCS | Mod: CPTII,S$GLB,, | Performed by: INTERNAL MEDICINE

## 2020-05-18 PROCEDURE — 99214 PR OFFICE/OUTPT VISIT, EST, LEVL IV, 30-39 MIN: ICD-10-PCS | Mod: S$GLB,,, | Performed by: INTERNAL MEDICINE

## 2020-05-18 PROCEDURE — 99999 PR PBB SHADOW E&M-EST. PATIENT-LVL III: ICD-10-PCS | Mod: PBBFAC,,, | Performed by: INTERNAL MEDICINE

## 2020-05-18 PROCEDURE — 71046 XR CHEST PA AND LATERAL: ICD-10-PCS | Mod: 26,,, | Performed by: RADIOLOGY

## 2020-05-18 PROCEDURE — 99214 OFFICE O/P EST MOD 30 MIN: CPT | Mod: S$GLB,,, | Performed by: INTERNAL MEDICINE

## 2020-05-18 PROCEDURE — 3008F BODY MASS INDEX DOCD: CPT | Mod: CPTII,S$GLB,, | Performed by: INTERNAL MEDICINE

## 2020-05-18 PROCEDURE — 71046 X-RAY EXAM CHEST 2 VIEWS: CPT | Mod: 26,,, | Performed by: RADIOLOGY

## 2020-05-18 PROCEDURE — 99999 PR PBB SHADOW E&M-EST. PATIENT-LVL III: CPT | Mod: PBBFAC,,, | Performed by: INTERNAL MEDICINE

## 2020-05-18 NOTE — PROGRESS NOTES
Subjective:       Patient ID: Nirmal Owens is a 35 y.o. male.    Chief Complaint: Follow-up    Patient comes in for follow-up of pneumonia.  Patient was seen 1 of our urgent care clinics in early April and was told he had nonspecific symptoms and to treat them symptomatic [place].  He did not meet criteria for COVID testing.  Two or 3 days later he went to a different urgent care and states his symptoms worsen.  He was tested for COVID and was negative however he says an x-ray he was told showed mild pneumonia and he was treated with azithromycin.  After about a week he slowly improved but was told he should follow-up with an x-ray in a few weeks.  He actually has not gone to work since then but would like to return on Thursday if I gave clearance.  I think it certainly sounds reasonable as it sounds like his symptoms have resolved.  I will do his exam and we will do a chest x-ray.    Review of Systems   Constitutional: Negative for chills, fatigue, fever and unexpected weight change.        Patient's says generally speaking he does not quite feel 100% but he is close and the majority of his symptoms have all resolved   HENT: Negative for nosebleeds and trouble swallowing.    Eyes: Negative for pain and visual disturbance.   Respiratory: Negative for cough, shortness of breath and wheezing.    Cardiovascular: Negative for chest pain and palpitations.   Gastrointestinal: Negative for abdominal pain, constipation, diarrhea, nausea and vomiting.   Genitourinary: Negative for difficulty urinating and hematuria.   Musculoskeletal: Negative for neck pain.   Skin: Negative for rash.   Neurological: Negative for dizziness and headaches.   Hematological: Does not bruise/bleed easily.   Psychiatric/Behavioral: Negative for dysphoric mood, sleep disturbance and suicidal ideas.       Objective:      Physical Exam   Constitutional: He is oriented to person, place, and time. He appears well-developed and well-nourished. No distress.    HENT:   Head: Normocephalic and atraumatic.   Right Ear: External ear normal.   Left Ear: External ear normal.   Mouth/Throat: Oropharynx is clear and moist. No oropharyngeal exudate.   TM's clear, pharynx clear   Eyes: Pupils are equal, round, and reactive to light. Conjunctivae and EOM are normal. No scleral icterus.   Neck: Normal range of motion. Neck supple. No thyromegaly present.   No supraclavicular nodes palpated   Cardiovascular: Normal rate, regular rhythm and normal heart sounds.   No murmur heard.  Pulmonary/Chest: Effort normal and breath sounds normal. He has no wheezes.   Abdominal: Soft. Bowel sounds are normal. He exhibits no mass. There is no tenderness.   Musculoskeletal: He exhibits no edema.   Lymphadenopathy:     He has no cervical adenopathy.   Neurological: He is alert and oriented to person, place, and time.   Skin: No pallor.   Psychiatric: He has a normal mood and affect.       Assessment:       1. Pneumonia due to infectious organism, unspecified laterality, unspecified part of lung        Plan:       Nirmal was seen today for follow-up.    Diagnoses and all orders for this visit:    Pneumonia due to infectious organism, unspecified laterality, unspecified part of lung  -     X-Ray Chest PA And Lateral; Future        clinically resolved-will review x-ray    If CXR is clear, will likely return to work on Thursday, 5/21/2020.         Addendum:  Chest x-ray was clear.  Patient notified.  Will clear patient to return to work on Thursday.

## 2020-05-18 NOTE — LETTER
May 18, 2020      [facility] - Internal Medicine  [address]  [facility] [address]  Phone: [phone]  Fax: [phone]       Patient: Nirmal Owens   YOB: 1985  Date of Visit: 05/18/2020    To Whom It May Concern:    Sharonda Owens  was at [facility] on 05/18/2020. He may return to work/school on 5/21/2020 with no restrictions. If you have any questions or concerns, or if I can be of further assistance, please do not hesitate to contact me.    Sincerely,         Hermes Rankin MD

## 2021-02-05 ENCOUNTER — LAB VISIT (OUTPATIENT)
Dept: LAB | Facility: HOSPITAL | Age: 36
End: 2021-02-05
Attending: INTERNAL MEDICINE
Payer: COMMERCIAL

## 2021-02-05 ENCOUNTER — OFFICE VISIT (OUTPATIENT)
Dept: INTERNAL MEDICINE | Facility: CLINIC | Age: 36
End: 2021-02-05
Payer: COMMERCIAL

## 2021-02-05 VITALS
WEIGHT: 167.56 LBS | BODY MASS INDEX: 22.21 KG/M2 | SYSTOLIC BLOOD PRESSURE: 118 MMHG | DIASTOLIC BLOOD PRESSURE: 70 MMHG | HEIGHT: 73 IN | OXYGEN SATURATION: 100 % | HEART RATE: 62 BPM

## 2021-02-05 DIAGNOSIS — R30.0 DYSURIA: ICD-10-CM

## 2021-02-05 DIAGNOSIS — D57.3 SICKLE CELL TRAIT: ICD-10-CM

## 2021-02-05 DIAGNOSIS — Z00.00 ROUTINE PHYSICAL EXAMINATION: ICD-10-CM

## 2021-02-05 DIAGNOSIS — Z00.00 ROUTINE PHYSICAL EXAMINATION: Primary | ICD-10-CM

## 2021-02-05 LAB
ALBUMIN SERPL BCP-MCNC: 4.4 G/DL (ref 3.5–5.2)
ALP SERPL-CCNC: 48 U/L (ref 55–135)
ALT SERPL W/O P-5'-P-CCNC: 20 U/L (ref 10–44)
ANION GAP SERPL CALC-SCNC: 9 MMOL/L (ref 8–16)
AST SERPL-CCNC: 19 U/L (ref 10–40)
BASOPHILS # BLD AUTO: 0.06 K/UL (ref 0–0.2)
BASOPHILS NFR BLD: 1 % (ref 0–1.9)
BILIRUB SERPL-MCNC: 0.6 MG/DL (ref 0.1–1)
BUN SERPL-MCNC: 15 MG/DL (ref 6–20)
CALCIUM SERPL-MCNC: 9.4 MG/DL (ref 8.7–10.5)
CHLORIDE SERPL-SCNC: 104 MMOL/L (ref 95–110)
CHOLEST SERPL-MCNC: 143 MG/DL (ref 120–199)
CHOLEST/HDLC SERPL: 2.6 {RATIO} (ref 2–5)
CO2 SERPL-SCNC: 29 MMOL/L (ref 23–29)
CREAT SERPL-MCNC: 1.2 MG/DL (ref 0.5–1.4)
DIFFERENTIAL METHOD: ABNORMAL
EOSINOPHIL # BLD AUTO: 0.3 K/UL (ref 0–0.5)
EOSINOPHIL NFR BLD: 5.2 % (ref 0–8)
ERYTHROCYTE [DISTWIDTH] IN BLOOD BY AUTOMATED COUNT: 16.4 % (ref 11.5–14.5)
EST. GFR  (AFRICAN AMERICAN): >60 ML/MIN/1.73 M^2
EST. GFR  (NON AFRICAN AMERICAN): >60 ML/MIN/1.73 M^2
GLUCOSE SERPL-MCNC: 99 MG/DL (ref 70–110)
HCT VFR BLD AUTO: 44.7 % (ref 40–54)
HDLC SERPL-MCNC: 54 MG/DL (ref 40–75)
HDLC SERPL: 37.8 % (ref 20–50)
HGB BLD-MCNC: 13.4 G/DL (ref 14–18)
IMM GRANULOCYTES # BLD AUTO: 0.01 K/UL (ref 0–0.04)
IMM GRANULOCYTES NFR BLD AUTO: 0.2 % (ref 0–0.5)
LDLC SERPL CALC-MCNC: 81.8 MG/DL (ref 63–159)
LYMPHOCYTES # BLD AUTO: 1.7 K/UL (ref 1–4.8)
LYMPHOCYTES NFR BLD: 27.3 % (ref 18–48)
MCH RBC QN AUTO: 23.2 PG (ref 27–31)
MCHC RBC AUTO-ENTMCNC: 30 G/DL (ref 32–36)
MCV RBC AUTO: 78 FL (ref 82–98)
MONOCYTES # BLD AUTO: 0.6 K/UL (ref 0.3–1)
MONOCYTES NFR BLD: 9.8 % (ref 4–15)
NEUTROPHILS # BLD AUTO: 3.5 K/UL (ref 1.8–7.7)
NEUTROPHILS NFR BLD: 56.5 % (ref 38–73)
NONHDLC SERPL-MCNC: 89 MG/DL
NRBC BLD-RTO: 0 /100 WBC
PLATELET # BLD AUTO: 141 K/UL (ref 150–350)
PMV BLD AUTO: ABNORMAL FL (ref 9.2–12.9)
POTASSIUM SERPL-SCNC: 4.2 MMOL/L (ref 3.5–5.1)
PROT SERPL-MCNC: 7.5 G/DL (ref 6–8.4)
RBC # BLD AUTO: 5.77 M/UL (ref 4.6–6.2)
SODIUM SERPL-SCNC: 142 MMOL/L (ref 136–145)
TRIGL SERPL-MCNC: 36 MG/DL (ref 30–150)
TSH SERPL DL<=0.005 MIU/L-ACNC: 0.84 UIU/ML (ref 0.4–4)
WBC # BLD AUTO: 6.12 K/UL (ref 3.9–12.7)

## 2021-02-05 PROCEDURE — 3008F PR BODY MASS INDEX (BMI) DOCUMENTED: ICD-10-PCS | Mod: CPTII,S$GLB,, | Performed by: INTERNAL MEDICINE

## 2021-02-05 PROCEDURE — 80061 LIPID PANEL: CPT

## 2021-02-05 PROCEDURE — 1126F AMNT PAIN NOTED NONE PRSNT: CPT | Mod: S$GLB,,, | Performed by: INTERNAL MEDICINE

## 2021-02-05 PROCEDURE — 1126F PR PAIN SEVERITY QUANTIFIED, NO PAIN PRESENT: ICD-10-PCS | Mod: S$GLB,,, | Performed by: INTERNAL MEDICINE

## 2021-02-05 PROCEDURE — 80053 COMPREHEN METABOLIC PANEL: CPT

## 2021-02-05 PROCEDURE — 99999 PR PBB SHADOW E&M-EST. PATIENT-LVL III: ICD-10-PCS | Mod: PBBFAC,,, | Performed by: INTERNAL MEDICINE

## 2021-02-05 PROCEDURE — 85025 COMPLETE CBC W/AUTO DIFF WBC: CPT

## 2021-02-05 PROCEDURE — 99999 PR PBB SHADOW E&M-EST. PATIENT-LVL III: CPT | Mod: PBBFAC,,, | Performed by: INTERNAL MEDICINE

## 2021-02-05 PROCEDURE — 84443 ASSAY THYROID STIM HORMONE: CPT

## 2021-02-05 PROCEDURE — 99395 PREV VISIT EST AGE 18-39: CPT | Mod: S$GLB,,, | Performed by: INTERNAL MEDICINE

## 2021-02-05 PROCEDURE — 99395 PR PREVENTIVE VISIT,EST,18-39: ICD-10-PCS | Mod: S$GLB,,, | Performed by: INTERNAL MEDICINE

## 2021-02-05 PROCEDURE — 3008F BODY MASS INDEX DOCD: CPT | Mod: CPTII,S$GLB,, | Performed by: INTERNAL MEDICINE

## 2021-02-05 PROCEDURE — 36415 COLL VENOUS BLD VENIPUNCTURE: CPT

## 2021-02-08 ENCOUNTER — PATIENT MESSAGE (OUTPATIENT)
Dept: INTERNAL MEDICINE | Facility: CLINIC | Age: 36
End: 2021-02-08

## 2021-02-11 LAB
C TRACH RRNA SPEC QL NAA+PROBE: NEGATIVE
N GONORRHOEA RRNA SPEC QL NAA+PROBE: NEGATIVE

## 2021-02-12 ENCOUNTER — PATIENT MESSAGE (OUTPATIENT)
Dept: INTERNAL MEDICINE | Facility: CLINIC | Age: 36
End: 2021-02-12

## 2021-04-16 ENCOUNTER — PATIENT MESSAGE (OUTPATIENT)
Dept: RESEARCH | Facility: HOSPITAL | Age: 36
End: 2021-04-16

## 2021-07-22 ENCOUNTER — OFFICE VISIT (OUTPATIENT)
Dept: FAMILY MEDICINE | Facility: CLINIC | Age: 36
End: 2021-07-22
Payer: COMMERCIAL

## 2021-07-22 DIAGNOSIS — T14.8XXA MUSCLE STRAIN: Primary | ICD-10-CM

## 2021-07-22 PROCEDURE — 99214 OFFICE O/P EST MOD 30 MIN: CPT | Mod: 95,,, | Performed by: PHYSICIAN ASSISTANT

## 2021-07-22 PROCEDURE — 99214 PR OFFICE/OUTPT VISIT, EST, LEVL IV, 30-39 MIN: ICD-10-PCS | Mod: 95,,, | Performed by: PHYSICIAN ASSISTANT

## 2021-07-24 ENCOUNTER — HOSPITAL ENCOUNTER (EMERGENCY)
Facility: HOSPITAL | Age: 36
Discharge: HOME | End: 2021-07-24
Attending: EMERGENCY MEDICINE
Payer: COMMERCIAL

## 2021-07-24 VITALS
RESPIRATION RATE: 18 BRPM | HEART RATE: 60 BPM | SYSTOLIC BLOOD PRESSURE: 115 MMHG | OXYGEN SATURATION: 100 % | DIASTOLIC BLOOD PRESSURE: 65 MMHG | TEMPERATURE: 99 F

## 2021-07-24 DIAGNOSIS — R42 ORTHOSTATIC DIZZINESS: Primary | ICD-10-CM

## 2021-07-24 DIAGNOSIS — R42 DIZZINESS: ICD-10-CM

## 2021-07-24 LAB
ALBUMIN SERPL BCP-MCNC: 4.7 G/DL (ref 3.5–5.2)
ALP SERPL-CCNC: 51 U/L (ref 55–135)
ALT SERPL W/O P-5'-P-CCNC: 13 U/L (ref 10–44)
ANION GAP SERPL CALC-SCNC: 11 MMOL/L (ref 8–16)
AST SERPL-CCNC: 13 U/L (ref 10–40)
BASOPHILS # BLD AUTO: 0.05 K/UL (ref 0–0.2)
BASOPHILS NFR BLD: 0.8 % (ref 0–1.9)
BILIRUB SERPL-MCNC: 0.7 MG/DL (ref 0.1–1)
BUN SERPL-MCNC: 11 MG/DL (ref 6–20)
CALCIUM SERPL-MCNC: 10 MG/DL (ref 8.7–10.5)
CHLORIDE SERPL-SCNC: 104 MMOL/L (ref 95–110)
CO2 SERPL-SCNC: 24 MMOL/L (ref 23–29)
CREAT SERPL-MCNC: 1.3 MG/DL (ref 0.5–1.4)
CTP QC/QA: YES
DIFFERENTIAL METHOD: ABNORMAL
EOSINOPHIL # BLD AUTO: 0.2 K/UL (ref 0–0.5)
EOSINOPHIL NFR BLD: 3.3 % (ref 0–8)
ERYTHROCYTE [DISTWIDTH] IN BLOOD BY AUTOMATED COUNT: 15.2 % (ref 11.5–14.5)
EST. GFR  (AFRICAN AMERICAN): >60 ML/MIN/1.73 M^2
EST. GFR  (NON AFRICAN AMERICAN): >60 ML/MIN/1.73 M^2
GLUCOSE SERPL-MCNC: 101 MG/DL (ref 70–110)
HCT VFR BLD AUTO: 43.8 % (ref 40–54)
HGB BLD-MCNC: 13.6 G/DL (ref 14–18)
IMM GRANULOCYTES # BLD AUTO: 0.01 K/UL (ref 0–0.04)
IMM GRANULOCYTES NFR BLD AUTO: 0.2 % (ref 0–0.5)
LYMPHOCYTES # BLD AUTO: 1.6 K/UL (ref 1–4.8)
LYMPHOCYTES NFR BLD: 25.2 % (ref 18–48)
MCH RBC QN AUTO: 23.1 PG (ref 27–31)
MCHC RBC AUTO-ENTMCNC: 31.1 G/DL (ref 32–36)
MCV RBC AUTO: 75 FL (ref 82–98)
MONOCYTES # BLD AUTO: 0.7 K/UL (ref 0.3–1)
MONOCYTES NFR BLD: 10.4 % (ref 4–15)
NEUTROPHILS # BLD AUTO: 3.9 K/UL (ref 1.8–7.7)
NEUTROPHILS NFR BLD: 60.1 % (ref 38–73)
NRBC BLD-RTO: 0 /100 WBC
PLATELET # BLD AUTO: 150 K/UL (ref 150–450)
PMV BLD AUTO: 13.1 FL (ref 9.2–12.9)
POTASSIUM SERPL-SCNC: 3.7 MMOL/L (ref 3.5–5.1)
PROT SERPL-MCNC: 7.9 G/DL (ref 6–8.4)
RBC # BLD AUTO: 5.88 M/UL (ref 4.6–6.2)
SARS-COV-2 RDRP RESP QL NAA+PROBE: NEGATIVE
SODIUM SERPL-SCNC: 139 MMOL/L (ref 136–145)
WBC # BLD AUTO: 6.44 K/UL (ref 3.9–12.7)

## 2021-07-24 PROCEDURE — 80053 COMPREHEN METABOLIC PANEL: CPT | Performed by: NURSE PRACTITIONER

## 2021-07-24 PROCEDURE — 25000003 PHARM REV CODE 250: Performed by: EMERGENCY MEDICINE

## 2021-07-24 PROCEDURE — U0002 COVID-19 LAB TEST NON-CDC: HCPCS | Performed by: NURSE PRACTITIONER

## 2021-07-24 PROCEDURE — 96360 HYDRATION IV INFUSION INIT: CPT

## 2021-07-24 PROCEDURE — 93010 ELECTROCARDIOGRAM REPORT: CPT | Mod: ,,, | Performed by: INTERNAL MEDICINE

## 2021-07-24 PROCEDURE — 93005 ELECTROCARDIOGRAM TRACING: CPT

## 2021-07-24 PROCEDURE — 93010 EKG 12-LEAD: ICD-10-PCS | Mod: ,,, | Performed by: INTERNAL MEDICINE

## 2021-07-24 PROCEDURE — 99284 EMERGENCY DEPT VISIT MOD MDM: CPT | Mod: 25

## 2021-07-24 PROCEDURE — 85025 COMPLETE CBC W/AUTO DIFF WBC: CPT | Performed by: NURSE PRACTITIONER

## 2021-07-24 RX ORDER — MECLIZINE HYDROCHLORIDE 25 MG/1
25 TABLET ORAL
Status: DISCONTINUED | OUTPATIENT
Start: 2021-07-24 | End: 2021-07-25 | Stop reason: HOSPADM

## 2021-07-24 RX ADMIN — SODIUM CHLORIDE 1000 ML: 0.9 INJECTION, SOLUTION INTRAVENOUS at 09:07

## 2021-08-10 ENCOUNTER — OFFICE VISIT (OUTPATIENT)
Dept: INTERNAL MEDICINE | Facility: CLINIC | Age: 36
End: 2021-08-10
Payer: COMMERCIAL

## 2021-08-10 VITALS
HEART RATE: 73 BPM | BODY MASS INDEX: 21.04 KG/M2 | SYSTOLIC BLOOD PRESSURE: 122 MMHG | HEIGHT: 73 IN | RESPIRATION RATE: 16 BRPM | DIASTOLIC BLOOD PRESSURE: 78 MMHG | WEIGHT: 158.75 LBS

## 2021-08-10 DIAGNOSIS — R42 VERTIGO: Primary | ICD-10-CM

## 2021-08-10 DIAGNOSIS — Z01.00 EYE EXAM, ROUTINE: ICD-10-CM

## 2021-08-10 DIAGNOSIS — D57.3 SICKLE CELL TRAIT: ICD-10-CM

## 2021-08-10 DIAGNOSIS — H81.10 BENIGN PAROXYSMAL POSITIONAL VERTIGO, UNSPECIFIED LATERALITY: ICD-10-CM

## 2021-08-10 PROCEDURE — 3078F DIAST BP <80 MM HG: CPT | Mod: CPTII,S$GLB,, | Performed by: INTERNAL MEDICINE

## 2021-08-10 PROCEDURE — 99999 PR PBB SHADOW E&M-EST. PATIENT-LVL III: CPT | Mod: PBBFAC,,, | Performed by: INTERNAL MEDICINE

## 2021-08-10 PROCEDURE — 1126F AMNT PAIN NOTED NONE PRSNT: CPT | Mod: CPTII,S$GLB,, | Performed by: INTERNAL MEDICINE

## 2021-08-10 PROCEDURE — 99213 OFFICE O/P EST LOW 20 MIN: CPT | Mod: S$GLB,,, | Performed by: INTERNAL MEDICINE

## 2021-08-10 PROCEDURE — 3074F SYST BP LT 130 MM HG: CPT | Mod: CPTII,S$GLB,, | Performed by: INTERNAL MEDICINE

## 2021-08-10 PROCEDURE — 1160F PR REVIEW ALL MEDS BY PRESCRIBER/CLIN PHARMACIST DOCUMENTED: ICD-10-PCS | Mod: CPTII,S$GLB,, | Performed by: INTERNAL MEDICINE

## 2021-08-10 PROCEDURE — 1126F PR PAIN SEVERITY QUANTIFIED, NO PAIN PRESENT: ICD-10-PCS | Mod: CPTII,S$GLB,, | Performed by: INTERNAL MEDICINE

## 2021-08-10 PROCEDURE — 1159F PR MEDICATION LIST DOCUMENTED IN MEDICAL RECORD: ICD-10-PCS | Mod: CPTII,S$GLB,, | Performed by: INTERNAL MEDICINE

## 2021-08-10 PROCEDURE — 99999 PR PBB SHADOW E&M-EST. PATIENT-LVL III: ICD-10-PCS | Mod: PBBFAC,,, | Performed by: INTERNAL MEDICINE

## 2021-08-10 PROCEDURE — 99213 PR OFFICE/OUTPT VISIT, EST, LEVL III, 20-29 MIN: ICD-10-PCS | Mod: S$GLB,,, | Performed by: INTERNAL MEDICINE

## 2021-08-10 PROCEDURE — 3074F PR MOST RECENT SYSTOLIC BLOOD PRESSURE < 130 MM HG: ICD-10-PCS | Mod: CPTII,S$GLB,, | Performed by: INTERNAL MEDICINE

## 2021-08-10 PROCEDURE — 3008F PR BODY MASS INDEX (BMI) DOCUMENTED: ICD-10-PCS | Mod: CPTII,S$GLB,, | Performed by: INTERNAL MEDICINE

## 2021-08-10 PROCEDURE — 3008F BODY MASS INDEX DOCD: CPT | Mod: CPTII,S$GLB,, | Performed by: INTERNAL MEDICINE

## 2021-08-10 PROCEDURE — 1159F MED LIST DOCD IN RCRD: CPT | Mod: CPTII,S$GLB,, | Performed by: INTERNAL MEDICINE

## 2021-08-10 PROCEDURE — 1160F RVW MEDS BY RX/DR IN RCRD: CPT | Mod: CPTII,S$GLB,, | Performed by: INTERNAL MEDICINE

## 2021-08-10 PROCEDURE — 3078F PR MOST RECENT DIASTOLIC BLOOD PRESSURE < 80 MM HG: ICD-10-PCS | Mod: CPTII,S$GLB,, | Performed by: INTERNAL MEDICINE

## 2021-08-17 ENCOUNTER — PATIENT MESSAGE (OUTPATIENT)
Dept: INTERNAL MEDICINE | Facility: CLINIC | Age: 36
End: 2021-08-17

## 2022-01-01 ENCOUNTER — PATIENT MESSAGE (OUTPATIENT)
Dept: ADMINISTRATIVE | Facility: OTHER | Age: 37
End: 2022-01-01
Payer: COMMERCIAL

## 2022-01-08 ENCOUNTER — OFFICE VISIT (OUTPATIENT)
Dept: INTERNAL MEDICINE | Facility: CLINIC | Age: 37
End: 2022-01-08
Payer: COMMERCIAL

## 2022-01-08 VITALS
OXYGEN SATURATION: 98 % | SYSTOLIC BLOOD PRESSURE: 113 MMHG | HEIGHT: 73 IN | DIASTOLIC BLOOD PRESSURE: 62 MMHG | BODY MASS INDEX: 20.16 KG/M2 | HEART RATE: 64 BPM | WEIGHT: 152.13 LBS | RESPIRATION RATE: 18 BRPM

## 2022-01-08 DIAGNOSIS — E55.9 VITAMIN D DEFICIENCY: ICD-10-CM

## 2022-01-08 DIAGNOSIS — Z00.00 ROUTINE PHYSICAL EXAMINATION: Primary | ICD-10-CM

## 2022-01-08 DIAGNOSIS — H43.393 VITREOUS FLOATERS OF BOTH EYES: ICD-10-CM

## 2022-01-08 DIAGNOSIS — L30.9 ECZEMA, UNSPECIFIED TYPE: ICD-10-CM

## 2022-01-08 DIAGNOSIS — D57.3 SICKLE CELL TRAIT: ICD-10-CM

## 2022-01-08 PROCEDURE — 99999 PR PBB SHADOW E&M-EST. PATIENT-LVL IV: CPT | Mod: PBBFAC,,, | Performed by: INTERNAL MEDICINE

## 2022-01-08 PROCEDURE — 3074F PR MOST RECENT SYSTOLIC BLOOD PRESSURE < 130 MM HG: ICD-10-PCS | Mod: CPTII,S$GLB,, | Performed by: INTERNAL MEDICINE

## 2022-01-08 PROCEDURE — 3074F SYST BP LT 130 MM HG: CPT | Mod: CPTII,S$GLB,, | Performed by: INTERNAL MEDICINE

## 2022-01-08 PROCEDURE — 3008F BODY MASS INDEX DOCD: CPT | Mod: CPTII,S$GLB,, | Performed by: INTERNAL MEDICINE

## 2022-01-08 PROCEDURE — 1159F PR MEDICATION LIST DOCUMENTED IN MEDICAL RECORD: ICD-10-PCS | Mod: CPTII,S$GLB,, | Performed by: INTERNAL MEDICINE

## 2022-01-08 PROCEDURE — 99395 PR PREVENTIVE VISIT,EST,18-39: ICD-10-PCS | Mod: S$GLB,,, | Performed by: INTERNAL MEDICINE

## 2022-01-08 PROCEDURE — 3078F PR MOST RECENT DIASTOLIC BLOOD PRESSURE < 80 MM HG: ICD-10-PCS | Mod: CPTII,S$GLB,, | Performed by: INTERNAL MEDICINE

## 2022-01-08 PROCEDURE — 3008F PR BODY MASS INDEX (BMI) DOCUMENTED: ICD-10-PCS | Mod: CPTII,S$GLB,, | Performed by: INTERNAL MEDICINE

## 2022-01-08 PROCEDURE — 1160F PR REVIEW ALL MEDS BY PRESCRIBER/CLIN PHARMACIST DOCUMENTED: ICD-10-PCS | Mod: CPTII,S$GLB,, | Performed by: INTERNAL MEDICINE

## 2022-01-08 PROCEDURE — 1160F RVW MEDS BY RX/DR IN RCRD: CPT | Mod: CPTII,S$GLB,, | Performed by: INTERNAL MEDICINE

## 2022-01-08 PROCEDURE — 99999 PR PBB SHADOW E&M-EST. PATIENT-LVL IV: ICD-10-PCS | Mod: PBBFAC,,, | Performed by: INTERNAL MEDICINE

## 2022-01-08 PROCEDURE — 3078F DIAST BP <80 MM HG: CPT | Mod: CPTII,S$GLB,, | Performed by: INTERNAL MEDICINE

## 2022-01-08 PROCEDURE — 1159F MED LIST DOCD IN RCRD: CPT | Mod: CPTII,S$GLB,, | Performed by: INTERNAL MEDICINE

## 2022-01-08 PROCEDURE — 99395 PREV VISIT EST AGE 18-39: CPT | Mod: S$GLB,,, | Performed by: INTERNAL MEDICINE

## 2022-01-08 NOTE — PATIENT INSTRUCTIONS
For a few weeks avoid cologne directly to the neck, avoid chains.   Restart Vitamin D supplement.   Moisturize after shower. Avoid very HOT showers.  Consider Allergy consult. I will place the order.

## 2022-01-08 NOTE — PROGRESS NOTES
Subjective:       Patient ID: Nirmal Owens is a 36 y.o. male.    Chief Complaint: Annual Exam    HPI: Pt here for Annual Exam. He wants to discuss possible allergies. Dry Skin and Hx of Eczema.  Thanks so he was blister on the hemorrhoid cream but while.  Area in the neck where he sometimes wears chain sprays cologne  Seems to be most affected  Worst area is around the neck.   He is concerned about food allergies. Hx of Vit D def but he is only taking a multivitamin. I suggested supplementing Vit D as well. May help skin.   Also has some floaters and vision changes, but has an eye appointment scheduled.     No change in family history.       Review of Systems   Constitutional: Negative for activity change and unexpected weight change.   HENT: Positive for rhinorrhea. Negative for hearing loss and trouble swallowing.    Eyes: Positive for visual disturbance. Negative for discharge.   Respiratory: Negative for chest tightness and wheezing.    Cardiovascular: Negative for chest pain and palpitations.   Gastrointestinal: Positive for diarrhea. Negative for blood in stool, constipation and vomiting.   Endocrine: Negative for polydipsia and polyuria.   Genitourinary: Negative for difficulty urinating, hematuria and urgency.   Musculoskeletal: Negative for arthralgias, joint swelling and neck pain.   Neurological: Positive for weakness and headaches.   Psychiatric/Behavioral: Negative for confusion and dysphoric mood.         Objective:      Physical Exam  Constitutional:       General: He is not in acute distress.     Appearance: He is well-developed.   HENT:      Head: Normocephalic and atraumatic.      Right Ear: Tympanic membrane, ear canal and external ear normal.      Left Ear: Tympanic membrane, ear canal and external ear normal.      Mouth/Throat:      Pharynx: No oropharyngeal exudate or posterior oropharyngeal erythema.   Eyes:      General: No scleral icterus.     Conjunctiva/sclera: Conjunctivae normal.       Pupils: Pupils are equal, round, and reactive to light.   Neck:      Thyroid: No thyromegaly.      Comments: No supraclavicular nodes palpated  Cardiovascular:      Rate and Rhythm: Normal rate and regular rhythm.      Pulses: Normal pulses.      Heart sounds: Normal heart sounds. No murmur heard.      Pulmonary:      Effort: Pulmonary effort is normal.      Breath sounds: Normal breath sounds. No wheezing.   Abdominal:      General: Bowel sounds are normal.      Palpations: Abdomen is soft. There is no mass.      Tenderness: There is no abdominal tenderness.   Musculoskeletal:         General: No tenderness.      Cervical back: Normal range of motion and neck supple.      Right lower leg: No edema.      Left lower leg: No edema.   Lymphadenopathy:      Cervical: No cervical adenopathy.   Skin:     Coloration: Skin is not jaundiced or pale.      Findings: Rash present.      Comments: Hypopigmentation areas scratching around the.  Dry skin on the flanks and at the elbows and knees.   Neurological:      General: No focal deficit present.      Mental Status: He is alert and oriented to person, place, and time.   Psychiatric:         Mood and Affect: Mood normal.         Behavior: Behavior normal.         Assessment:       Problem List Items Addressed This Visit        Oncology    Sickle cell trait      Other Visit Diagnoses     Routine physical examination    -  Primary    Eczema, unspecified type        Diagnosed as a child.     Relevant Orders    Ambulatory referral/consult to Allergy    Vitamin D deficiency        Vitreous floaters of both eyes        Keep eye exam          Plan:       Nirmal was seen today for annual exam.    Diagnoses and all orders for this visit:    Routine physical examination    Sickle cell trait    Eczema, unspecified type  Comments:  Diagnosed as a child.   Orders:  -     Ambulatory referral/consult to Allergy; Future    Vitamin D deficiency    Vitreous floaters of both eyes  Comments:  Keep  eye exam            For a few weeks avoid cologne directly to the neck, avoid chains.   Restart Vitamin D supplement.   Moisturize after shower. Avoid very HOT showers.  Consider Allergy consult. I will place the order.    Recently had labs five months and 10 months ago so I will not repeat those for now.

## 2022-01-25 ENCOUNTER — OFFICE VISIT (OUTPATIENT)
Dept: OPTOMETRY | Facility: CLINIC | Age: 37
End: 2022-01-25
Payer: COMMERCIAL

## 2022-01-25 DIAGNOSIS — Z01.00 EYE EXAM, ROUTINE: ICD-10-CM

## 2022-01-25 DIAGNOSIS — H52.223 REGULAR ASTIGMATISM OF BOTH EYES: Primary | ICD-10-CM

## 2022-01-25 DIAGNOSIS — H43.393 VITREOUS FLOATERS OF BOTH EYES: ICD-10-CM

## 2022-01-25 PROCEDURE — 99999 PR PBB SHADOW E&M-EST. PATIENT-LVL III: ICD-10-PCS | Mod: PBBFAC,,, | Performed by: OPTOMETRIST

## 2022-01-25 PROCEDURE — 1160F RVW MEDS BY RX/DR IN RCRD: CPT | Mod: CPTII,S$GLB,, | Performed by: OPTOMETRIST

## 2022-01-25 PROCEDURE — 92004 PR EYE EXAM, NEW PATIENT,COMPREHESV: ICD-10-PCS | Mod: S$GLB,,, | Performed by: OPTOMETRIST

## 2022-01-25 PROCEDURE — 92015 DETERMINE REFRACTIVE STATE: CPT | Mod: S$GLB,,, | Performed by: OPTOMETRIST

## 2022-01-25 PROCEDURE — 1159F MED LIST DOCD IN RCRD: CPT | Mod: CPTII,S$GLB,, | Performed by: OPTOMETRIST

## 2022-01-25 PROCEDURE — 1159F PR MEDICATION LIST DOCUMENTED IN MEDICAL RECORD: ICD-10-PCS | Mod: CPTII,S$GLB,, | Performed by: OPTOMETRIST

## 2022-01-25 PROCEDURE — 92015 PR REFRACTION: ICD-10-PCS | Mod: S$GLB,,, | Performed by: OPTOMETRIST

## 2022-01-25 PROCEDURE — 92004 COMPRE OPH EXAM NEW PT 1/>: CPT | Mod: S$GLB,,, | Performed by: OPTOMETRIST

## 2022-01-25 PROCEDURE — 1160F PR REVIEW ALL MEDS BY PRESCRIBER/CLIN PHARMACIST DOCUMENTED: ICD-10-PCS | Mod: CPTII,S$GLB,, | Performed by: OPTOMETRIST

## 2022-01-25 PROCEDURE — 99999 PR PBB SHADOW E&M-EST. PATIENT-LVL III: CPT | Mod: PBBFAC,,, | Performed by: OPTOMETRIST

## 2022-01-25 NOTE — PROGRESS NOTES
HPI     Presents today to establish ocular health care.  No vision complaints.  Floaters since a kid--slight increase lately, Occ flashes  No gtts    Last edited by Cara Whitney MA on 1/25/2022  8:30 AM. (History)        ROS     Negative for: Constitutional, Gastrointestinal, Neurological, Skin,   Genitourinary, Musculoskeletal, HENT, Endocrine, Cardiovascular, Eyes,   Respiratory, Psychiatric, Allergic/Imm, Heme/Lymph    Last edited by Omega Miles, OD on 1/25/2022  8:38 AM. (History)        Assessment /Plan     For exam results, see Encounter Report.    Regular astigmatism of both eyes    Eye exam, routine  -     Ambulatory referral/consult to Optometry    Vitreous floaters of both eyes      1. astig OU--wrote spex Rx.  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 Vitreous floaters OU--discussed Signs/Symptoms of Retinal Detachment.      PLAN:    rtc 1 yr, or Pt to rtc immediately if increased Floaters/Flashes occur

## 2022-01-31 ENCOUNTER — PATIENT OUTREACH (OUTPATIENT)
Dept: ADMINISTRATIVE | Facility: OTHER | Age: 37
End: 2022-01-31
Payer: COMMERCIAL

## 2022-02-01 ENCOUNTER — OFFICE VISIT (OUTPATIENT)
Dept: ALLERGY | Facility: CLINIC | Age: 37
End: 2022-02-01
Payer: COMMERCIAL

## 2022-02-01 VITALS — BODY MASS INDEX: 20.74 KG/M2 | HEIGHT: 73 IN | WEIGHT: 156.5 LBS

## 2022-02-01 DIAGNOSIS — L30.9 ECZEMA, UNSPECIFIED TYPE: ICD-10-CM

## 2022-02-01 DIAGNOSIS — L50.1 CHRONIC IDIOPATHIC URTICARIA: Primary | ICD-10-CM

## 2022-02-01 DIAGNOSIS — J30.9 ALLERGIC RHINITIS, UNSPECIFIED SEASONALITY, UNSPECIFIED TRIGGER: ICD-10-CM

## 2022-02-01 PROCEDURE — 1159F PR MEDICATION LIST DOCUMENTED IN MEDICAL RECORD: ICD-10-PCS | Mod: CPTII,S$GLB,, | Performed by: STUDENT IN AN ORGANIZED HEALTH CARE EDUCATION/TRAINING PROGRAM

## 2022-02-01 PROCEDURE — 3008F BODY MASS INDEX DOCD: CPT | Mod: CPTII,S$GLB,, | Performed by: STUDENT IN AN ORGANIZED HEALTH CARE EDUCATION/TRAINING PROGRAM

## 2022-02-01 PROCEDURE — 99999 PR PBB SHADOW E&M-EST. PATIENT-LVL III: CPT | Mod: PBBFAC,,, | Performed by: STUDENT IN AN ORGANIZED HEALTH CARE EDUCATION/TRAINING PROGRAM

## 2022-02-01 PROCEDURE — 99204 OFFICE O/P NEW MOD 45 MIN: CPT | Mod: S$GLB,,, | Performed by: STUDENT IN AN ORGANIZED HEALTH CARE EDUCATION/TRAINING PROGRAM

## 2022-02-01 PROCEDURE — 99999 PR PBB SHADOW E&M-EST. PATIENT-LVL III: ICD-10-PCS | Mod: PBBFAC,,, | Performed by: STUDENT IN AN ORGANIZED HEALTH CARE EDUCATION/TRAINING PROGRAM

## 2022-02-01 PROCEDURE — 1159F MED LIST DOCD IN RCRD: CPT | Mod: CPTII,S$GLB,, | Performed by: STUDENT IN AN ORGANIZED HEALTH CARE EDUCATION/TRAINING PROGRAM

## 2022-02-01 PROCEDURE — 99204 PR OFFICE/OUTPT VISIT, NEW, LEVL IV, 45-59 MIN: ICD-10-PCS | Mod: S$GLB,,, | Performed by: STUDENT IN AN ORGANIZED HEALTH CARE EDUCATION/TRAINING PROGRAM

## 2022-02-01 PROCEDURE — 3008F PR BODY MASS INDEX (BMI) DOCUMENTED: ICD-10-PCS | Mod: CPTII,S$GLB,, | Performed by: STUDENT IN AN ORGANIZED HEALTH CARE EDUCATION/TRAINING PROGRAM

## 2022-02-01 RX ORDER — CETIRIZINE HYDROCHLORIDE 10 MG/1
10 TABLET ORAL DAILY
Qty: 30 TABLET | Refills: 11 | Status: SHIPPED | OUTPATIENT
Start: 2022-02-01 | End: 2023-02-01

## 2022-02-01 RX ORDER — TRIAMCINOLONE ACETONIDE 1 MG/G
OINTMENT TOPICAL 2 TIMES DAILY
Qty: 80 G | Refills: 1 | Status: SHIPPED | OUTPATIENT
Start: 2022-02-01

## 2022-02-01 NOTE — PROGRESS NOTES
ALLERGY & IMMUNOLOGY CLINIC - INITIAL CONSULTATION     HISTORY OF PRESENT ILLNESS     Patient ID: Nirmal Owens is a 37 y.o. male    CC: pruritus, urticaria    HPI: Nirmal Owens is a 37 y.o. male with a history of atopic dermatitis and allergic rhinitis presenting for pruritus and urticaria.  He was referred by Hermes Rankin MD.    Pruritus and urticaria started maybe 2 years ago.  Pruritus occurs almost every night. The actual urticaria occur less then once per month.  The pruritus can occur on his back, parts of his arms, behind his legs, torso, chest. The pruritus tends to move around. He woke up with hives one night last week. Symptoms resolved within a few hours.  No angioedema.  Lesions are pruritic, not painful.  Patient denies identifiable triggers including alcohol and NSAIDs. He says he tried eliminating eggs from his diet, but didn't notice much of a difference. He says sometimes he will have periods without itching for a while. The itching starts before he eats dinner or as he is eating dinner.  He is not on any medications. He has tried hydrocortisone in the past, but it didn't help. He has not tried antihistamines.  Patient denies fevers, chills, night sweats, unexplained weight loss, blood in stool, melena, easy bruising or bleeding, unusual lumps or bumps, and hot/cold intolerance.    He does have a history of eczema. He has eczematous rash on his neck, but not in the other places where he gets pruritus. He does not have any topicals for it. He was using a regular lotion, now using an oil.    He says he has had skin prick testing done a couple of years ago and remembers being allergic to pollen. He says he gets rhinitis from time to time, but not too bothersome.    Vaccines:   Immunization History   Administered Date(s) Administered    DTP 1986, 1986, 1986, 03/27/1987, 09/07/1990    HIB 09/07/1990    Hepatitis B 06/01/2000, 08/14/2000, 01/03/2001    MMR 03/27/1987, 06/01/2000  "   PPD Test 06/01/2000    Poliovirus 1986, 1986, 1986, 03/27/1987, 09/07/1990    Td (ADULT) 06/01/2000        REVIEW OF SYSTEMS     CONST: no F/C/NS, no unexplained weight changes  PULM: no SOB, no wheezing, no cough  GI: no pain, no BRBPR/melena  H/O: no easy bruising or bleeding  MSK: no joint pain  DERM: + pruritus and urticaria      MEDICAL HISTORY     MedHx:   Patient Active Problem List   Diagnosis    Sickle cell trait       SurgHx:   Past Surgical History:   Procedure Laterality Date    TESTICLE SURGERY         Medications:   No current outpatient medications on file prior to visit.     No current facility-administered medications on file prior to visit.       H/o Asthma: as a child, but he thinks he has grown out ogf it  H/o Eczema: endorses  H/o Rhinitis: endorses  Oral Allergy: denies  Food Allergy: denies  Venom Allergy: denies    Drug Allergies: Review of patient's allergies indicates:  No Known Allergies     Env/Occ:   Pets: no  Occupation: works for the [email or web address]    SocHx:   Social History     Tobacco Use    Smoking status: Never Smoker    Smokeless tobacco: Never Used   Substance Use Topics    Alcohol use: Yes     Alcohol/week: 0.0 standard drinks     Comment: occasionally    Drug use: No       FamHx:   Asthma: mother  Allergic rhinitis: no  Family History   Problem Relation Age of Onset    Hypertension Mother     Asthma Mother     Seizures Father     Cancer Father         unsure source     Sickle cell trait Father     Glaucoma Maternal Grandmother     Diabetes Maternal Grandmother     Hypertension Maternal Grandmother     Ulcers Cousin         PHYSICAL EXAM     VS: Ht 6' 0.99" (1.854 m)   Wt 71 kg (156 lb 8.4 oz)   BMI 20.66 kg/m²   GENERAL: Alert, NAD, well-appearing, cooperative  EYES: EOMI, no conjunctival injection, no discharge, no infraorbital shiners  EARS: external auditory canals normal B/L, TM normal B/L  NOSE: NT 2+ *B/L, no stringing mucous, no polyps " visualized  ORAL: MMM, no ulcers, no thrush, no cobblestoning  NECK: no thyromegaly, no LAD  LUNGS: CTAB, no w/r/c, no increased WOB  HEART: RRR, normal S1/S2, no m/g/r  ABDOMEN: BS present, soft, non-tender, non-distended  EXTREMITIES: No LE edema  DERM: + hyperpigmented xerotic skin on anterior of neck, otherwise skin appears well-moisturized and without rashes  NEURO: normal speech, normal gait, no facial asymmetry     LABORATORY STUDIES     Component      Latest Ref Rng & Units 7/24/2021 2/5/2021   WBC      3.90 - 12.70 K/uL 6.44 6.12   RBC      4.60 - 6.20 M/uL 5.88 5.77   Hemoglobin      14.0 - 18.0 g/dL 13.6 (L) 13.4 (L)   Hematocrit      40.0 - 54.0 % 43.8 44.7   MCV      82 - 98 fL 75 (L) 78 (L)   MCH      27.0 - 31.0 pg 23.1 (L) 23.2 (L)   MCHC      32.0 - 36.0 g/dL 31.1 (L) 30.0 (L)   RDW      11.5 - 14.5 % 15.2 (H) 16.4 (H)   Platelets      150 - 450 K/uL 150 141 (L)   MPV      9.2 - 12.9 fL 13.1 (H) SEE COMMENT   Immature Granulocytes      0.0 - 0.5 % 0.2 0.2   Gran # (ANC)      1.8 - 7.7 K/uL 3.9 3.5   Immature Grans (Abs)      0.00 - 0.04 K/uL 0.01 0.01   Lymph #      1.0 - 4.8 K/uL 1.6 1.7   Mono #      0.3 - 1.0 K/uL 0.7 0.6   Eos #      0.0 - 0.5 K/uL 0.2 0.3   Baso #      0.00 - 0.20 K/uL 0.05 0.06   nRBC      0 /100 WBC 0 0   Gran %      38.0 - 73.0 % 60.1 56.5   Lymph %      18.0 - 48.0 % 25.2 27.3   Mono %      4.0 - 15.0 % 10.4 9.8   Eosinophil %      0.0 - 8.0 % 3.3 5.2   Basophil %      0.0 - 1.9 % 0.8 1.0   Differential Method       Automated Automated   Sodium      136 - 145 mmol/L 139 142   Potassium      3.5 - 5.1 mmol/L 3.7 4.2   Chloride      95 - 110 mmol/L 104 104   CO2      23 - 29 mmol/L 24 29   Glucose      70 - 110 mg/dL 101 99   BUN      6 - 20 mg/dL 11 15   Creatinine      0.5 - 1.4 mg/dL 1.3 1.2   Calcium      8.7 - 10.5 mg/dL 10.0 9.4   PROTEIN TOTAL      6.0 - 8.4 g/dL 7.9 7.5   Albumin      3.5 - 5.2 g/dL 4.7 4.4   BILIRUBIN TOTAL      0.1 - 1.0 mg/dL 0.7 0.6   Alkaline  Phosphatase      55 - 135 U/L 51 (L) 48 (L)   AST      10 - 40 U/L 13 19   ALT      10 - 44 U/L 13 20   Anion Gap      8 - 16 mmol/L 11 9   eGFR if African American      >60 mL/min/1.73 m:2 >60 >60.0   eGFR if non African American      >60 mL/min/1.73 m:2 >60 >60.0   TSH      0.400 - 4.000 uIU/mL  0.839        ALLERGEN TESTING     Skin Prick:   Historical- 2019 or 2020. Patient recalls positives to pollens.     IMAGING & OTHER DIAGNOSTICS     CXR 5/18/20:  FINDINGS:  Heart size is within normal limits.  Mediastinum is unremarkable.  There is no tracheal abnormality.  Lungs are well inflated.  There is no lobar consolidations or pneumothorax or pulmonary vascular congestion or pleural effusions.  Visualized bony thorax demonstrates no significant abnormalities.  Impression:  No acute cardiopulmonary process.        CHART REVIEW     Reviewed pcp note, imaging, labs.     ASSESSMENT & PLAN     Nirmal Owens is a 37 y.o. male with     # Chronic spontaneous urticaria: Patient gets pruritus without a rash almost every night, and will very occasionally get urticaria with it. He has never tried antihistamines for it. Course of symptoms not consistent with an allergic reaction. Symptoms most likely spontaneous.  -start cetirizine 10 mg daily, titrate up to lowest dose that controls symptoms (max 20 mg BID)    # Atopic dermatitis: Currently with mild flare on anterior neck.  -start triamcinolone 0.1% ointment BID for flares  -counseled on frequent moisturizing    # Allergic rhinitis: patient recalls spt with positives to pollen. Symptoms not to bothersome to him, and he usually take meds for it.    Follow up: 4 weeks      Narcisa Ch MD  Allergy/Immunology

## 2022-02-01 NOTE — PATIENT INSTRUCTIONS
"For your hives and itching:   Your diagnosis is chronic spontaneous urticaria (hives). The first-line treatment for this is high dose antihistamines. I recommend using zyrtec (cetirizine), claritin (loratadine), or allegra (fexofenadine) up to 2 tablets twice daily. Cetirizine is likely the most effective, but if you find it sedating, you could try one of the other two.  I have prescribed the cetirizine to take once daily, but if you need to increase the dose to 1 or 2 tablets twice per day, you can supplement with over the counter cetirizine.  The generic versions of these medications can be bought in bulk for reasonable prices at [employer] or [employer]. If you do not have a membership to either of these, [employer] is likely the next cheapest option.    For the eczema:  I have prescribed triamcinolone ointment to use up to twice daily on flares. The key to preventing flares is moisturizing (at least twice per day if possible). I recommend against "lotions" because they have high water content. You want to look for non-scented creams or ointments. Some examples of creams are eucerin cream and cereve cream. Examples of ointments include aquaphor and vaseline.  "

## 2022-02-09 ENCOUNTER — TELEPHONE (OUTPATIENT)
Dept: ELECTROPHYSIOLOGY | Facility: CLINIC | Age: 37
End: 2022-02-09
Payer: COMMERCIAL

## 2022-02-09 ENCOUNTER — OFFICE VISIT (OUTPATIENT)
Dept: CARDIOLOGY | Facility: CLINIC | Age: 37
End: 2022-02-09
Payer: COMMERCIAL

## 2022-02-09 ENCOUNTER — HOSPITAL ENCOUNTER (OUTPATIENT)
Dept: CARDIOLOGY | Facility: CLINIC | Age: 37
Discharge: HOME | End: 2022-02-09
Payer: COMMERCIAL

## 2022-02-09 VITALS
HEIGHT: 73 IN | DIASTOLIC BLOOD PRESSURE: 58 MMHG | WEIGHT: 157.88 LBS | BODY MASS INDEX: 20.92 KG/M2 | HEART RATE: 84 BPM | SYSTOLIC BLOOD PRESSURE: 121 MMHG

## 2022-02-09 DIAGNOSIS — I49.8 OTHER SPECIFIED CARDIAC ARRHYTHMIAS: Primary | ICD-10-CM

## 2022-02-09 DIAGNOSIS — R00.2 PALPITATIONS: Primary | ICD-10-CM

## 2022-02-09 DIAGNOSIS — Z00.00 PHYSICAL EXAM: Primary | ICD-10-CM

## 2022-02-09 DIAGNOSIS — Z00.00 PHYSICAL EXAM: ICD-10-CM

## 2022-02-09 PROCEDURE — 99999 PR PBB SHADOW E&M-EST. PATIENT-LVL III: CPT | Mod: PBBFAC,,, | Performed by: STUDENT IN AN ORGANIZED HEALTH CARE EDUCATION/TRAINING PROGRAM

## 2022-02-09 PROCEDURE — 93000 EKG 12-LEAD: ICD-10-PCS | Mod: S$GLB,,, | Performed by: INTERNAL MEDICINE

## 2022-02-09 PROCEDURE — 99203 OFFICE O/P NEW LOW 30 MIN: CPT | Mod: 25,S$GLB,, | Performed by: STUDENT IN AN ORGANIZED HEALTH CARE EDUCATION/TRAINING PROGRAM

## 2022-02-09 PROCEDURE — 93000 ELECTROCARDIOGRAM COMPLETE: CPT | Mod: S$GLB,,, | Performed by: INTERNAL MEDICINE

## 2022-02-09 PROCEDURE — 99999 PR PBB SHADOW E&M-EST. PATIENT-LVL III: ICD-10-PCS | Mod: PBBFAC,,, | Performed by: STUDENT IN AN ORGANIZED HEALTH CARE EDUCATION/TRAINING PROGRAM

## 2022-02-09 PROCEDURE — 99203 PR OFFICE/OUTPT VISIT, NEW, LEVL III, 30-44 MIN: ICD-10-PCS | Mod: 25,S$GLB,, | Performed by: STUDENT IN AN ORGANIZED HEALTH CARE EDUCATION/TRAINING PROGRAM

## 2022-02-09 NOTE — TELEPHONE ENCOUNTER
Spoke with pt to r/s his appointment with general cardiology because he scheduled himself on Dr. Nguyen schedule. Time and date confirmed.

## 2022-02-09 NOTE — TELEPHONE ENCOUNTER
Spoke with Mr. Owens to ask a few questions for his appt with Dr. Nguyen. The pt stated he has had an ECG at [facility] that has him concerned. The pt was informed another ECG will be done once he arrives. The pt verbalized understanding.

## 2022-02-09 NOTE — PROGRESS NOTES
Cardiology Clinic Note  Reason for Visit: palpitations  2/9/22    HPI:     Nirmal Owens is a 36 y/o M who presents for urgent care visit with palpitations. Pt reports on Saturday, while he was driving, he experienced acute onset fluttering in his chest and occasional skipped beats. Episode lasted around 4-5 minutes before spontaneous cessation. No diaphoresis, chest pain or SOB but he was concerned and presented to [facility] ER, where he was told labs and vitals were wnl. This morning, he had another brief episode lasting few seconds and he became concerned. No presyncope or syncope. No chest pain, SOB, light headedness. No FHx of SCD.     He is active, lifts weights 3 x /week and no exertional symptoms. Drinks < 1 cup of caffeine per day. No tobacco, alcohol or other drug use. Sleeps on average 6 hours per night. 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     ROS:    Constitution: Negative for fever or chills. Negative for weight loss or gain.   HENT: Negative for sore throat or headaches. Negative for rhinorrhea.  Eyes: Negative for blurred or double vision.   Cardiovascular: See above  Pulmonary: Negative for SOB. Negative for cough.   Gastrointestinal: Negative for abdominal pain. Negative for nausea/ vomiting. Negative for diarrhea.   : Negative for dysuria.   Neurological: Negative for focal weakness or sensory changes.  PMH:     Past Medical History:   Diagnosis Date    Allergy     Asthma     Eczema     Urticaria      Past Surgical History:   Procedure Laterality Date    TESTICLE SURGERY       Allergies:   Review of patient's allergies indicates:  No Known Allergies  Medications:     Current Outpatient Medications on File Prior to Visit   Medication Sig Dispense Refill    cetirizine (ZYRTEC) 10 MG tablet Take 1 tablet (10 mg total) by mouth once daily. (Patient not taking: Reported on 2/9/2022) 30 tablet 11    triamcinolone acetonide  "0.1% (KENALOG) 0.1 % ointment Apply topically 2 (two) times daily. For eczema (Patient not taking: Reported on 2/9/2022) 80 g 1     No current facility-administered medications on file prior to visit.     Social History:     Social History     Tobacco Use    Smoking status: Never Smoker    Smokeless tobacco: Never Used   Substance Use Topics    Alcohol use: Yes     Alcohol/week: 0.0 standard drinks     Comment: occasionally     Family History:     Family History   Problem Relation Age of Onset    Hypertension Mother     Asthma Mother     Seizures Father     Cancer Father         unsure source     Sickle cell trait Father     Glaucoma Maternal Grandmother     Diabetes Maternal Grandmother     Hypertension Maternal Grandmother     Ulcers Cousin      Physical Exam:   BP (!) 121/58 (BP Location: Left arm, Patient Position: Sitting, BP Method: Medium (Automatic))   Pulse 84   Ht 6' 1" (1.854 m)   Wt 71.6 kg (157 lb 13.6 oz)   BMI 20.83 kg/m²      Constitutional: No acute distress, conversant  HEENT: Sclera anicteric, extraocular motions intact, Oropharynx clear  Neck: No JVD, no carotid bruits  Cardiovascular: regular rate and rhythm, no murmur, rubs or gallops, normal S1/S2  Pulmonary: Clear to auscultation bilaterally  Abdominal: Abdomen soft, nontender, nondistended, positive bowel sounds  Extremities: No lower extremity edema,   Pulses: 2+ BL Radial, 2+ BL carotid, 2+ BL DP, 2+ BL PT  Skin: No ecchymosis, erythema, or ulcers  Psych: Alert and oriented x 3, appropriate affect  Neuro: CNII-XII intact, no focal deficits    Labs:     Lab Results   Component Value Date     07/24/2021    K 3.7 07/24/2021     07/24/2021    CO2 24 07/24/2021    BUN 11 07/24/2021    CREATININE 1.3 07/24/2021    ANIONGAP 11 07/24/2021     Lab Results   Component Value Date    AST 13 07/24/2021    ALT 13 07/24/2021    ALKPHOS 51 (L) 07/24/2021    BILITOT 0.7 07/24/2021    ALBUMIN 4.7 07/24/2021     Lab Results "   Component Value Date    CALCIUM 10.0 07/24/2021     No results found for: BNP, BNPTRIAGEBLO Lab Results   Component Value Date    WBC 6.44 07/24/2021    HGB 13.6 (L) 07/24/2021    HCT 43.8 07/24/2021     07/24/2021    GRAN 3.9 07/24/2021    GRAN 60.1 07/24/2021     No results found for: PTT, INR  Lab Results   Component Value Date    CHOL 143 02/05/2021    HDL 54 02/05/2021    LDLCALC 81.8 02/05/2021    TRIG 36 02/05/2021     No results found for: HGBA1C  Lab Results   Component Value Date    TSH 0.839 02/05/2021          Imaging:     EKG 2/9/22: sinus rhythm 89 bpm    Assessment/Plan:     Palpitations   - no lone triggers such as caffeine, alcohol identified. May be stress related. TSH wnl.   - ECG reviewed today and pt reassured  - plan for 48 hours holter monitor. If pt has recurrent symptoms, can consider 30 day event monitor in the future    Patient seen and plan of care discussed with Dr. Gilbert     Signed:  Phoebe Bird MD  Cardiology Fellow PGY VI

## 2022-02-10 NOTE — PROGRESS NOTES
I have personally taken the history and examined this patient and agree with the Fellow's note as stated above.    Will see if Holter captures anything - discussed likely benign nature of dysrhythmia in absence of structural HD

## 2022-07-19 ENCOUNTER — OFFICE VISIT (OUTPATIENT)
Dept: INTERNAL MEDICINE | Facility: CLINIC | Age: 37
End: 2022-07-19
Payer: COMMERCIAL

## 2022-07-19 ENCOUNTER — LAB VISIT (OUTPATIENT)
Dept: LAB | Facility: HOSPITAL | Age: 37
End: 2022-07-19
Payer: COMMERCIAL

## 2022-07-19 VITALS
WEIGHT: 155.19 LBS | SYSTOLIC BLOOD PRESSURE: 122 MMHG | HEIGHT: 73 IN | DIASTOLIC BLOOD PRESSURE: 74 MMHG | BODY MASS INDEX: 20.57 KG/M2 | OXYGEN SATURATION: 98 % | HEART RATE: 96 BPM

## 2022-07-19 DIAGNOSIS — Z11.3 SCREENING FOR STD (SEXUALLY TRANSMITTED DISEASE): ICD-10-CM

## 2022-07-19 DIAGNOSIS — N50.9 SCROTAL SKIN LESION: Primary | ICD-10-CM

## 2022-07-19 DIAGNOSIS — L73.1 INGROWN HAIR: ICD-10-CM

## 2022-07-19 PROCEDURE — 87389 HIV-1 AG W/HIV-1&-2 AB AG IA: CPT | Performed by: PHYSICIAN ASSISTANT

## 2022-07-19 PROCEDURE — 3074F PR MOST RECENT SYSTOLIC BLOOD PRESSURE < 130 MM HG: ICD-10-PCS | Mod: CPTII,S$GLB,, | Performed by: PHYSICIAN ASSISTANT

## 2022-07-19 PROCEDURE — 99214 PR OFFICE/OUTPT VISIT, EST, LEVL IV, 30-39 MIN: ICD-10-PCS | Mod: S$GLB,,, | Performed by: PHYSICIAN ASSISTANT

## 2022-07-19 PROCEDURE — 99999 PR PBB SHADOW E&M-EST. PATIENT-LVL III: ICD-10-PCS | Mod: PBBFAC,,, | Performed by: PHYSICIAN ASSISTANT

## 2022-07-19 PROCEDURE — 3008F PR BODY MASS INDEX (BMI) DOCUMENTED: ICD-10-PCS | Mod: CPTII,S$GLB,, | Performed by: PHYSICIAN ASSISTANT

## 2022-07-19 PROCEDURE — 87591 N.GONORRHOEAE DNA AMP PROB: CPT | Performed by: PHYSICIAN ASSISTANT

## 2022-07-19 PROCEDURE — 99999 PR PBB SHADOW E&M-EST. PATIENT-LVL III: CPT | Mod: PBBFAC,,, | Performed by: PHYSICIAN ASSISTANT

## 2022-07-19 PROCEDURE — 36415 COLL VENOUS BLD VENIPUNCTURE: CPT | Performed by: PHYSICIAN ASSISTANT

## 2022-07-19 PROCEDURE — 87491 CHLMYD TRACH DNA AMP PROBE: CPT | Performed by: PHYSICIAN ASSISTANT

## 2022-07-19 PROCEDURE — 3008F BODY MASS INDEX DOCD: CPT | Mod: CPTII,S$GLB,, | Performed by: PHYSICIAN ASSISTANT

## 2022-07-19 PROCEDURE — 99214 OFFICE O/P EST MOD 30 MIN: CPT | Mod: S$GLB,,, | Performed by: PHYSICIAN ASSISTANT

## 2022-07-19 PROCEDURE — 3078F PR MOST RECENT DIASTOLIC BLOOD PRESSURE < 80 MM HG: ICD-10-PCS | Mod: CPTII,S$GLB,, | Performed by: PHYSICIAN ASSISTANT

## 2022-07-19 PROCEDURE — 3078F DIAST BP <80 MM HG: CPT | Mod: CPTII,S$GLB,, | Performed by: PHYSICIAN ASSISTANT

## 2022-07-19 PROCEDURE — 1159F PR MEDICATION LIST DOCUMENTED IN MEDICAL RECORD: ICD-10-PCS | Mod: CPTII,S$GLB,, | Performed by: PHYSICIAN ASSISTANT

## 2022-07-19 PROCEDURE — 80074 ACUTE HEPATITIS PANEL: CPT | Performed by: PHYSICIAN ASSISTANT

## 2022-07-19 PROCEDURE — 3074F SYST BP LT 130 MM HG: CPT | Mod: CPTII,S$GLB,, | Performed by: PHYSICIAN ASSISTANT

## 2022-07-19 PROCEDURE — 86592 SYPHILIS TEST NON-TREP QUAL: CPT | Performed by: PHYSICIAN ASSISTANT

## 2022-07-19 PROCEDURE — 1159F MED LIST DOCD IN RCRD: CPT | Mod: CPTII,S$GLB,, | Performed by: PHYSICIAN ASSISTANT

## 2022-07-19 RX ORDER — CLINDAMYCIN PHOSPHATE 10 MG/G
GEL TOPICAL 2 TIMES DAILY
Qty: 30 G | Refills: 0 | Status: SHIPPED | OUTPATIENT
Start: 2022-07-19

## 2022-07-20 ENCOUNTER — PATIENT MESSAGE (OUTPATIENT)
Dept: INTERNAL MEDICINE | Facility: CLINIC | Age: 37
End: 2022-07-20
Payer: COMMERCIAL

## 2022-07-20 LAB
C TRACH DNA SPEC QL NAA+PROBE: NOT DETECTED
HAV IGM SERPL QL IA: NEGATIVE
HBV CORE IGM SERPL QL IA: NEGATIVE
HBV SURFACE AG SERPL QL IA: NEGATIVE
HCV AB SERPL QL IA: NEGATIVE
HIV 1+2 AB+HIV1 P24 AG SERPL QL IA: NEGATIVE
N GONORRHOEA DNA SPEC QL NAA+PROBE: NOT DETECTED
RPR SER QL: NORMAL

## 2022-07-21 NOTE — PROGRESS NOTES
"      Subjective:       Patient ID: Nirmal Owens is a 37 y.o. male.    Chief Complaint: Mass    HPI     Established pt of Hermes Rankin MD (new to me)      Here with concerns of bump to his scrotal area. He noticed about one month ago. He states similar bumps have appeared before that typically resolve with warm soaks. No pain or drainage. He often uses a razor or clippers for hair removal. Request STI screening.     Past Medical History:   Diagnosis Date    Allergy     Asthma     Eczema     Urticaria      Social History     Tobacco Use    Smoking status: Never Smoker    Smokeless tobacco: Never Used   Substance Use Topics    Alcohol use: Yes     Alcohol/week: 0.0 standard drinks     Comment: occasionally    Drug use: No     Review of patient's allergies indicates:  No Known Allergies        Review of Systems   Genitourinary: Negative for difficulty urinating, discharge, dysuria, penile pain, penile swelling and scrotal swelling.   Hematological: Negative for adenopathy.       Answers for HPI/ROS submitted by the patient on 7/19/2022  activity change: No  unexpected weight change: No  neck pain: No  hearing loss: No  rhinorrhea: No  trouble swallowing: No  eye discharge: No  visual disturbance: No  chest tightness: No  wheezing: No  chest pain: No  palpitations: No  blood in stool: No  constipation: Yes  vomiting: No  diarrhea: No  polydipsia: No  polyuria: No  difficulty urinating: No  urgency: No  hematuria: No  joint swelling: No  arthralgias: No  headaches: No  weakness: No  confusion: No  dysphoric mood: No  Objective: /74 (BP Location: Left arm, Patient Position: Sitting, BP Method: Medium (Manual))   Pulse 96   Ht 6' 1" (1.854 m)   Wt 70.4 kg (155 lb 3.3 oz)   SpO2 98%   BMI 20.48 kg/m²         Physical Exam  Vitals reviewed. Exam conducted with a chaperone present (Nurse Stover).   Constitutional:       General: He is not in acute distress.     Appearance: He is well-developed. "   HENT:      Head: Normocephalic and atraumatic.   Cardiovascular:      Rate and Rhythm: Normal rate and regular rhythm.      Heart sounds: No murmur heard.  Pulmonary:      Effort: Pulmonary effort is normal.      Breath sounds: Normal breath sounds. No wheezing or rales.   Abdominal:      General: Bowel sounds are normal.      Palpations: Abdomen is soft.      Tenderness: There is no abdominal tenderness.   Genitourinary:     Penis: Circumcised.       Testes: Normal.         Right: Mass, tenderness or swelling not present.         Left: Mass, tenderness or swelling not present.      Comments: Small subcm nodule to perineum region associated with hair follicle. No redness, ulceration, drainage or swelling  Musculoskeletal:         General: Normal range of motion.   Lymphadenopathy:      Lower Body: No right inguinal adenopathy. No left inguinal adenopathy.   Skin:     General: Skin is warm and dry.      Findings: No rash.   Neurological:      Mental Status: He is alert.         Assessment:       Problem List Items Addressed This Visit    None     Visit Diagnoses     Scrotal skin lesion    -  Primary    Relevant Medications    clindamycin phosphate 1% (CLINDAGEL) 1 % gel    Other Relevant Orders    US Scrotum And Testicles (Completed)    Screening for STD (sexually transmitted disease)        Relevant Orders    RPR (Completed)    HIV 1/2 Ag/Ab (4th Gen) (Completed)    C. trachomatis/N. gonorrhoeae by AMP DNA (Completed)    Hepatitis Panel, Acute (Completed)    Ingrown hair        Relevant Medications    clindamycin phosphate 1% (CLINDAGEL) 1 % gel          Plan:         Nirmal was seen today for mass.    Diagnoses and all orders for this visit:    Scrotal skin lesion  Ingrown hair  -     clindamycin phosphate 1% (CLINDAGEL) 1 % gel; Apply topically 2 (two) times daily.  -     US Scrotum And Testicles; Future    Screening for STD (sexually transmitted disease)  -     RPR; Future  -     HIV 1/2 Ag/Ab (4th Gen);  Future  -     C. trachomatis/N. gonorrhoeae by AMP DNA  -     Hepatitis Panel, Acute; Future      Discussed exfoliation, hair removal techniques to prevent ingrown hairs  STI screening as above  US for pt reassurance    Addendum:    US Scrotum And Testicles  Narrative: EXAMINATION:  US SCROTUM AND TESTICLES  Other findings: Imaging at the area of skin abnormality within the left groin demonstrates no definite sonographic abnormality.  Impression: Minimal left-sided varicocele    Electronically signed by: Mitch Steel MD  Date:    07/20/2022  Time:    13:54        Annabel Hernadez PA-C

## 2023-10-16 ENCOUNTER — OFFICE VISIT (OUTPATIENT)
Dept: INTERNAL MEDICINE | Facility: CLINIC | Age: 38
End: 2023-10-16
Payer: COMMERCIAL

## 2023-10-16 ENCOUNTER — LAB VISIT (OUTPATIENT)
Dept: LAB | Facility: HOSPITAL | Age: 38
End: 2023-10-16
Payer: COMMERCIAL

## 2023-10-16 VITALS
BODY MASS INDEX: 21.71 KG/M2 | OXYGEN SATURATION: 99 % | HEIGHT: 73 IN | DIASTOLIC BLOOD PRESSURE: 72 MMHG | SYSTOLIC BLOOD PRESSURE: 118 MMHG | WEIGHT: 163.81 LBS | HEART RATE: 57 BPM

## 2023-10-16 DIAGNOSIS — Z11.3 ROUTINE SCREENING FOR STI (SEXUALLY TRANSMITTED INFECTION): ICD-10-CM

## 2023-10-16 DIAGNOSIS — R39.15 URINARY URGENCY: ICD-10-CM

## 2023-10-16 DIAGNOSIS — D57.3 SICKLE CELL TRAIT: ICD-10-CM

## 2023-10-16 DIAGNOSIS — D64.9 ANEMIA, UNSPECIFIED TYPE: ICD-10-CM

## 2023-10-16 DIAGNOSIS — Z00.00 ANNUAL PHYSICAL EXAM: Primary | ICD-10-CM

## 2023-10-16 DIAGNOSIS — Z00.00 ANNUAL PHYSICAL EXAM: ICD-10-CM

## 2023-10-16 LAB
25(OH)D3+25(OH)D2 SERPL-MCNC: 17 NG/ML (ref 30–96)
ALBUMIN SERPL BCP-MCNC: 4.4 G/DL (ref 3.5–5.2)
ALP SERPL-CCNC: 49 U/L (ref 55–135)
ALT SERPL W/O P-5'-P-CCNC: 12 U/L (ref 10–44)
ANION GAP SERPL CALC-SCNC: 7 MMOL/L (ref 8–16)
AST SERPL-CCNC: 15 U/L (ref 10–40)
BASOPHILS # BLD AUTO: 0.05 K/UL (ref 0–0.2)
BASOPHILS NFR BLD: 1.1 % (ref 0–1.9)
BILIRUB SERPL-MCNC: 0.7 MG/DL (ref 0.1–1)
BILIRUB UR QL STRIP: NEGATIVE
BUN SERPL-MCNC: 14 MG/DL (ref 6–20)
CALCIUM SERPL-MCNC: 9.8 MG/DL (ref 8.7–10.5)
CHLORIDE SERPL-SCNC: 105 MMOL/L (ref 95–110)
CHOLEST SERPL-MCNC: 146 MG/DL (ref 120–199)
CHOLEST/HDLC SERPL: 2.7 {RATIO} (ref 2–5)
CLARITY UR REFRACT.AUTO: CLEAR
CO2 SERPL-SCNC: 29 MMOL/L (ref 23–29)
COLOR UR AUTO: YELLOW
CREAT SERPL-MCNC: 1.3 MG/DL (ref 0.5–1.4)
DIFFERENTIAL METHOD: ABNORMAL
EOSINOPHIL # BLD AUTO: 0.4 K/UL (ref 0–0.5)
EOSINOPHIL NFR BLD: 8.5 % (ref 0–8)
ERYTHROCYTE [DISTWIDTH] IN BLOOD BY AUTOMATED COUNT: 15.8 % (ref 11.5–14.5)
EST. GFR  (NO RACE VARIABLE): >60 ML/MIN/1.73 M^2
ESTIMATED AVG GLUCOSE: 117 MG/DL (ref 68–131)
FERRITIN SERPL-MCNC: 145 NG/ML (ref 20–300)
FOLATE SERPL-MCNC: 10.1 NG/ML (ref 4–24)
GLUCOSE SERPL-MCNC: 93 MG/DL (ref 70–110)
GLUCOSE UR QL STRIP: NEGATIVE
HBA1C MFR BLD: 5.7 % (ref 4–5.6)
HCT VFR BLD AUTO: 45.4 % (ref 40–54)
HDLC SERPL-MCNC: 55 MG/DL (ref 40–75)
HDLC SERPL: 37.7 % (ref 20–50)
HGB BLD-MCNC: 13.6 G/DL (ref 14–18)
HGB UR QL STRIP: NEGATIVE
HIV 1+2 AB+HIV1 P24 AG SERPL QL IA: NORMAL
IMM GRANULOCYTES # BLD AUTO: 0 K/UL (ref 0–0.04)
IMM GRANULOCYTES NFR BLD AUTO: 0 % (ref 0–0.5)
IRON SERPL-MCNC: 132 UG/DL (ref 45–160)
KETONES UR QL STRIP: NEGATIVE
LDLC SERPL CALC-MCNC: 84 MG/DL (ref 63–159)
LEUKOCYTE ESTERASE UR QL STRIP: NEGATIVE
LYMPHOCYTES # BLD AUTO: 1.5 K/UL (ref 1–4.8)
LYMPHOCYTES NFR BLD: 31.7 % (ref 18–48)
MCH RBC QN AUTO: 23.4 PG (ref 27–31)
MCHC RBC AUTO-ENTMCNC: 30 G/DL (ref 32–36)
MCV RBC AUTO: 78 FL (ref 82–98)
MONOCYTES # BLD AUTO: 0.5 K/UL (ref 0.3–1)
MONOCYTES NFR BLD: 11.8 % (ref 4–15)
NEUTROPHILS # BLD AUTO: 2.1 K/UL (ref 1.8–7.7)
NEUTROPHILS NFR BLD: 46.9 % (ref 38–73)
NITRITE UR QL STRIP: NEGATIVE
NONHDLC SERPL-MCNC: 91 MG/DL
NRBC BLD-RTO: 0 /100 WBC
PH UR STRIP: 7 [PH] (ref 5–8)
PLATELET # BLD AUTO: 133 K/UL (ref 150–450)
PMV BLD AUTO: ABNORMAL FL (ref 9.2–12.9)
POTASSIUM SERPL-SCNC: 4.6 MMOL/L (ref 3.5–5.1)
PROT SERPL-MCNC: 7.5 G/DL (ref 6–8.4)
PROT UR QL STRIP: NEGATIVE
RBC # BLD AUTO: 5.82 M/UL (ref 4.6–6.2)
SATURATED IRON: 39 % (ref 20–50)
SODIUM SERPL-SCNC: 141 MMOL/L (ref 136–145)
SP GR UR STRIP: 1.02 (ref 1–1.03)
TOTAL IRON BINDING CAPACITY: 339 UG/DL (ref 250–450)
TRANSFERRIN SERPL-MCNC: 229 MG/DL (ref 200–375)
TRIGL SERPL-MCNC: 35 MG/DL (ref 30–150)
TSH SERPL DL<=0.005 MIU/L-ACNC: 0.72 UIU/ML (ref 0.4–4)
URN SPEC COLLECT METH UR: NORMAL
VIT B12 SERPL-MCNC: 1066 PG/ML (ref 210–950)
WBC # BLD AUTO: 4.57 K/UL (ref 3.9–12.7)

## 2023-10-16 PROCEDURE — 82306 VITAMIN D 25 HYDROXY: CPT | Performed by: PHYSICIAN ASSISTANT

## 2023-10-16 PROCEDURE — 3074F PR MOST RECENT SYSTOLIC BLOOD PRESSURE < 130 MM HG: ICD-10-PCS | Mod: CPTII,S$GLB,, | Performed by: PHYSICIAN ASSISTANT

## 2023-10-16 PROCEDURE — 82728 ASSAY OF FERRITIN: CPT | Performed by: PHYSICIAN ASSISTANT

## 2023-10-16 PROCEDURE — 1160F PR REVIEW ALL MEDS BY PRESCRIBER/CLIN PHARMACIST DOCUMENTED: ICD-10-PCS | Mod: CPTII,S$GLB,, | Performed by: PHYSICIAN ASSISTANT

## 2023-10-16 PROCEDURE — 85025 COMPLETE CBC W/AUTO DIFF WBC: CPT | Performed by: PHYSICIAN ASSISTANT

## 2023-10-16 PROCEDURE — 3008F PR BODY MASS INDEX (BMI) DOCUMENTED: ICD-10-PCS | Mod: CPTII,S$GLB,, | Performed by: PHYSICIAN ASSISTANT

## 2023-10-16 PROCEDURE — 84466 ASSAY OF TRANSFERRIN: CPT | Performed by: PHYSICIAN ASSISTANT

## 2023-10-16 PROCEDURE — 84443 ASSAY THYROID STIM HORMONE: CPT | Performed by: PHYSICIAN ASSISTANT

## 2023-10-16 PROCEDURE — 81003 URINALYSIS AUTO W/O SCOPE: CPT | Performed by: PHYSICIAN ASSISTANT

## 2023-10-16 PROCEDURE — 3078F PR MOST RECENT DIASTOLIC BLOOD PRESSURE < 80 MM HG: ICD-10-PCS | Mod: CPTII,S$GLB,, | Performed by: PHYSICIAN ASSISTANT

## 2023-10-16 PROCEDURE — 1159F MED LIST DOCD IN RCRD: CPT | Mod: CPTII,S$GLB,, | Performed by: PHYSICIAN ASSISTANT

## 2023-10-16 PROCEDURE — 99395 PREV VISIT EST AGE 18-39: CPT | Mod: S$GLB,,, | Performed by: PHYSICIAN ASSISTANT

## 2023-10-16 PROCEDURE — 87389 HIV-1 AG W/HIV-1&-2 AB AG IA: CPT | Performed by: PHYSICIAN ASSISTANT

## 2023-10-16 PROCEDURE — 80061 LIPID PANEL: CPT | Performed by: PHYSICIAN ASSISTANT

## 2023-10-16 PROCEDURE — 1159F PR MEDICATION LIST DOCUMENTED IN MEDICAL RECORD: ICD-10-PCS | Mod: CPTII,S$GLB,, | Performed by: PHYSICIAN ASSISTANT

## 2023-10-16 PROCEDURE — 36415 COLL VENOUS BLD VENIPUNCTURE: CPT | Performed by: PHYSICIAN ASSISTANT

## 2023-10-16 PROCEDURE — 1160F RVW MEDS BY RX/DR IN RCRD: CPT | Mod: CPTII,S$GLB,, | Performed by: PHYSICIAN ASSISTANT

## 2023-10-16 PROCEDURE — 83036 HEMOGLOBIN GLYCOSYLATED A1C: CPT | Performed by: PHYSICIAN ASSISTANT

## 2023-10-16 PROCEDURE — 86592 SYPHILIS TEST NON-TREP QUAL: CPT | Performed by: PHYSICIAN ASSISTANT

## 2023-10-16 PROCEDURE — 82746 ASSAY OF FOLIC ACID SERUM: CPT | Performed by: PHYSICIAN ASSISTANT

## 2023-10-16 PROCEDURE — 83540 ASSAY OF IRON: CPT | Performed by: PHYSICIAN ASSISTANT

## 2023-10-16 PROCEDURE — 99999 PR PBB SHADOW E&M-EST. PATIENT-LVL IV: ICD-10-PCS | Mod: PBBFAC,,, | Performed by: PHYSICIAN ASSISTANT

## 2023-10-16 PROCEDURE — 3074F SYST BP LT 130 MM HG: CPT | Mod: CPTII,S$GLB,, | Performed by: PHYSICIAN ASSISTANT

## 2023-10-16 PROCEDURE — 80053 COMPREHEN METABOLIC PANEL: CPT | Performed by: PHYSICIAN ASSISTANT

## 2023-10-16 PROCEDURE — 3008F BODY MASS INDEX DOCD: CPT | Mod: CPTII,S$GLB,, | Performed by: PHYSICIAN ASSISTANT

## 2023-10-16 PROCEDURE — 82607 VITAMIN B-12: CPT | Performed by: PHYSICIAN ASSISTANT

## 2023-10-16 PROCEDURE — 3078F DIAST BP <80 MM HG: CPT | Mod: CPTII,S$GLB,, | Performed by: PHYSICIAN ASSISTANT

## 2023-10-16 PROCEDURE — 99999 PR PBB SHADOW E&M-EST. PATIENT-LVL IV: CPT | Mod: PBBFAC,,, | Performed by: PHYSICIAN ASSISTANT

## 2023-10-16 PROCEDURE — 99395 PR PREVENTIVE VISIT,EST,18-39: ICD-10-PCS | Mod: S$GLB,,, | Performed by: PHYSICIAN ASSISTANT

## 2023-10-16 NOTE — PROGRESS NOTES
"Subjective     Patient ID: Nirmal Owens is a 38 y.o. male.    Chief Complaint: Annual Exam    HPI    Established pt of Hermes Rankin MD     Here for annual exam.     He notes urinary urge, sensation of incomplete emptying for the past month. Notices mostly at work. Inquires if his tighter fitting pants could be related. No dysuria or hematuria. Often holds bladder. This has happpened past when he was a (unable to take freq bathroom breaks). Practicing abstinence, pelvic floor muscle seem tight per pt.     Hx of sickle cell trait, inquires about vitamins, anemia has been stable    Saw optometry for floaters (chronic).     Generalized itching around neck, saw allergy and derm in the past. No current rash.     Past Medical History:   Diagnosis Date    Allergy     Asthma     Eczema     Urticaria      Social History     Tobacco Use    Smoking status: Never    Smokeless tobacco: Never   Substance Use Topics    Alcohol use: Yes     Alcohol/week: 0.0 standard drinks of alcohol     Comment: occasionally    Drug use: No     Review of patient's allergies indicates:  No Known Allergies      Review of Systems   Constitutional:  Negative for chills, fever and unexpected weight change.   Respiratory:  Negative for cough and shortness of breath.    Cardiovascular:  Negative for chest pain and leg swelling.   Gastrointestinal:  Negative for abdominal pain, nausea and vomiting.   Genitourinary:  Positive for urgency.   Integumentary:  Negative for rash.        As per HPI   Neurological:  Negative for weakness and headaches.          Objective  /72 (BP Location: Right arm, Patient Position: Sitting, BP Method: Medium (Manual))   Pulse (!) 57   Ht 6' 1" (1.854 m)   Wt 74.3 kg (163 lb 12.8 oz)   SpO2 99%   BMI 21.61 kg/m²       Physical Exam  Vitals reviewed.   Constitutional:       General: He is not in acute distress.     Appearance: He is well-developed. He is not ill-appearing.   HENT:      Head: " Normocephalic and atraumatic.      Right Ear: Tympanic membrane, ear canal and external ear normal.      Left Ear: Tympanic membrane, ear canal and external ear normal.   Cardiovascular:      Rate and Rhythm: Normal rate and regular rhythm.      Heart sounds: No murmur heard.  Pulmonary:      Effort: Pulmonary effort is normal.      Breath sounds: Normal breath sounds. No wheezing or rales.   Abdominal:      General: Bowel sounds are normal.      Palpations: Abdomen is soft.      Tenderness: There is no abdominal tenderness.   Musculoskeletal:      Right lower leg: No edema.      Left lower leg: No edema.   Lymphadenopathy:      Cervical: No cervical adenopathy.   Skin:     General: Skin is warm and dry.      Findings: No rash.   Neurological:      Mental Status: He is alert.   Psychiatric:         Mood and Affect: Mood normal.            Assessment and Plan     1. Annual physical exam  -     CBC Auto Differential; Future; Expected date: 10/16/2023  -     Comprehensive Metabolic Panel; Future; Expected date: 10/16/2023  -     TSH; Future; Expected date: 10/16/2023  -     Lipid Panel; Future; Expected date: 10/16/2023  -     Hemoglobin A1C; Future; Expected date: 10/16/2023  -     Vitamin D; Future; Expected date: 10/16/2023  -     Urinalysis, Reflex to Urine Culture Urine, Clean Catch    2. Sickle cell trait  -     IRON AND TIBC; Future; Expected date: 10/16/2023  -     Ferritin; Future; Expected date: 10/16/2023  -     Vitamin B12; Future; Expected date: 10/16/2023  -     FOLATE; Future; Expected date: 10/16/2023    3. Anemia, unspecified type  -     IRON AND TIBC; Future; Expected date: 10/16/2023  -     Ferritin; Future; Expected date: 10/16/2023  -     Vitamin B12; Future; Expected date: 10/16/2023  -     FOLATE; Future; Expected date: 10/16/2023    4. Routine screening for STI (sexually transmitted infection)  -     C. trachomatis/N. gonorrhoeae by AMP DNA  -     HIV 1/2 Ag/Ab (4th Gen); Future; Expected date:  10/16/2023  -     RPR; Future; Expected date: 10/16/2023    5. Urinary urgency  -     Urinalysis, Reflex to Urine Culture Urine, Clean Catch        Lab/urine as above  OTC Cerave anti itch/dry skin lotion recommended  Discussed behavioral mods for urinary concern (further recs to follow pending results)  If persists, consider urology consults  RTC in 1 year for next annual    MARIS FelicianoC

## 2023-10-17 LAB — RPR SER QL: NORMAL

## 2024-02-05 ENCOUNTER — HOSPITAL ENCOUNTER (EMERGENCY)
Facility: OTHER | Age: 39
Discharge: HOME | End: 2024-02-05
Attending: EMERGENCY MEDICINE
Payer: COMMERCIAL

## 2024-02-05 VITALS
DIASTOLIC BLOOD PRESSURE: 62 MMHG | HEIGHT: 73 IN | WEIGHT: 153 LBS | BODY MASS INDEX: 20.28 KG/M2 | SYSTOLIC BLOOD PRESSURE: 120 MMHG | TEMPERATURE: 99 F | RESPIRATION RATE: 16 BRPM | HEART RATE: 72 BPM | OXYGEN SATURATION: 100 %

## 2024-02-05 DIAGNOSIS — H00.014 HORDEOLUM EXTERNUM OF LEFT UPPER EYELID: Primary | ICD-10-CM

## 2024-02-05 PROCEDURE — 99283 EMERGENCY DEPT VISIT LOW MDM: CPT

## 2024-02-05 RX ORDER — POLYMYXIN B SULFATE AND TRIMETHOPRIM 1; 10000 MG/ML; [USP'U]/ML
1 SOLUTION OPHTHALMIC EVERY 6 HOURS
Qty: 10 ML | Refills: 0 | Status: SHIPPED | OUTPATIENT
Start: 2024-02-05 | End: 2024-02-08

## 2024-02-05 NOTE — ED PROVIDER NOTES
"Source of History:  Patient    Chief complaint:  Eye Problem (L eyelid swelling and redness when waking up this morning. )      HPI:  Nirmal Owens is a 39 y.o. male presenting with complaint of left upper eyelid swelling since when he woke up this morning.  Reports no vision changes.    This is the extent to the patients complaints today here in the emergency department.    PMH:  As per HPI and below:  Past Medical History:   Diagnosis Date    Allergy     Asthma     Eczema     Urticaria      Past Surgical History:   Procedure Laterality Date    TESTICLE SURGERY         Social History     Tobacco Use    Smoking status: Never    Smokeless tobacco: Never   Substance Use Topics    Alcohol use: Yes     Alcohol/week: 0.0 standard drinks of alcohol     Comment: occasionally    Drug use: No       Review of patient's allergies indicates:  No Known Allergies    ROS: As per HPI and below:  Constitutional: No fever.  No chills.  Eyes:  Eyelid swelling  ENT: No sore throat. No ear pain.  MSK: No back pain. No joint pain.   Integument: No rashes or lesions.    Physical Exam:    /62   Pulse 72   Temp 98.5 °F (36.9 °C) (Oral)   Resp 16   Ht 6' 1" (1.854 m)   Wt 69.4 kg (153 lb)   SpO2 100%   BMI 20.19 kg/m²   Vitals:    02/05/24 0915 02/05/24 1001   BP: 120/62 120/62   Pulse: 72 72   Resp: 16 16   Temp: 98.5 °F (36.9 °C) 98.5 °F (36.9 °C)   TempSrc: Oral Oral   SpO2: 100% 100%   Weight: 69.4 kg (153 lb)    Height: 6' 1" (1.854 m)        Nursing note and vital signs reviewed.  Constitutional: No acute distress.  Nontoxic  ENT: Normal phonation.  Musculoskeletal: Good range of motion all joints.  No deformities.  Neck supple.  No meningismus. Neurovascularly intact.  Skin: No rashes seen.  Good turgor.  No abrasions.  No ecchymoses.  Psych:  Appropriate, conversant.  Physical Exam  Eyes:      General: Lids are everted, no foreign bodies appreciated.         Right eye: No foreign body.         Left eye: Hordeolum " present.No foreign body.      Extraocular Movements: Extraocular movements intact.      Right eye: Normal extraocular motion and no nystagmus.      Left eye: Normal extraocular motion and no nystagmus.      Conjunctiva/sclera:      Right eye: Right conjunctiva is not injected. No chemosis.     Left eye: Left conjunctiva is not injected. No chemosis.            No orders to display       MDM/ Differential Dx:   Differential Diagnosis includes, but is not limited to:  Corneal abrasion, retained foreign body, periorbital or orbital cellulitis, conjunctivitis  Medical Decision Making  After complete evaluation, including thorough history and physical exam, the patient's symptoms are most likely due an internal stye. There is no significant visual acuity deficit. There are no physical exam features of hyphema, foreign body, or corneal abrasion/ulceration to suggest significant trauma or open globe injury. There is no fever, rash, or hypopion to suggest infection. The affected pupils are symmetric and reactive, and symptoms do not suggest acute glaucoma, iritis, or optic neuritis. The patient will be given symptomatic treatment with Polytrim drops.  Discussed use of hot compresses 4 times a day..        Risk  Prescription drug management.             Diagnostic Impression:    1. Hordeolum externum of left upper eyelid         ED Disposition Condition    Discharge Stable            ED Prescriptions       Medication Sig Dispense Start Date End Date Auth. Provider    polymyxin B sulf-trimethoprim (POLYTRIM) 10,000 unit- 1 mg/mL Drop Place 1 drop into the left eye every 6 (six) hours. for 3 days 10 mL 2/5/2024 2/8/2024 Eugenie Camacho, FNP          Follow-up Information       Follow up With Specialties Details Why Contact Info    [facility] - Emergency Dept Emergency Medicine Go to  If symptoms worsen [address] [facility] [id number] [phone]    Hermes Rankin MD Internal Medicine Schedule an  appointment as soon as possible for a visit in 3 days  [address]  [facility] [id number]  [phone]               Eugenie Camacho, FNP  02/05/24 8313

## 2024-02-07 ENCOUNTER — OFFICE VISIT (OUTPATIENT)
Dept: OPTOMETRY | Facility: CLINIC | Age: 39
End: 2024-02-07
Payer: COMMERCIAL

## 2024-02-07 DIAGNOSIS — Z01.00 ROUTINE EYE EXAM: Primary | ICD-10-CM

## 2024-02-07 PROCEDURE — 92014 COMPRE OPH EXAM EST PT 1/>: CPT | Mod: S$GLB,,, | Performed by: OPTOMETRIST

## 2024-02-07 PROCEDURE — 99999 PR PBB SHADOW E&M-EST. PATIENT-LVL II: CPT | Mod: PBBFAC,,, | Performed by: OPTOMETRIST

## 2024-02-07 PROCEDURE — 92015 DETERMINE REFRACTIVE STATE: CPT | Mod: S$GLB,,, | Performed by: OPTOMETRIST

## 2024-02-07 NOTE — PROGRESS NOTES
HPI    Patient is here today for routine eye exam. Patient was last seen at the   ER on 02/05/2024 for hordeolum OS. He was given Polytrim and has been   using it every 6 hours. Patient describes his vision to have a film over   his eye, especially in dim lights and at night. No pain or discomfort.   Occasional floaters.   Last edited by Wilma Mendoza on 2/7/2024 10:59 AM.            Assessment /Plan     For exam results, see Encounter Report.    Routine eye exam      Astig OU--pt happy without spex  Resolving small hordeolum MAGO--saw [facility] 2 days ago and given POLYTRIM drops.  Pt says resolving and not bothersome    PLAN:    Rtc 1 yr

## 2024-06-13 ENCOUNTER — LAB VISIT (OUTPATIENT)
Dept: LAB | Facility: HOSPITAL | Age: 39
End: 2024-06-13
Payer: COMMERCIAL

## 2024-06-13 ENCOUNTER — OFFICE VISIT (OUTPATIENT)
Dept: INTERNAL MEDICINE | Facility: CLINIC | Age: 39
End: 2024-06-13
Payer: COMMERCIAL

## 2024-06-13 VITALS
HEART RATE: 53 BPM | SYSTOLIC BLOOD PRESSURE: 116 MMHG | OXYGEN SATURATION: 99 % | HEIGHT: 73 IN | BODY MASS INDEX: 21.39 KG/M2 | DIASTOLIC BLOOD PRESSURE: 66 MMHG | WEIGHT: 161.38 LBS

## 2024-06-13 DIAGNOSIS — Z00.00 ANNUAL PHYSICAL EXAM: ICD-10-CM

## 2024-06-13 DIAGNOSIS — L30.9 ECZEMA, UNSPECIFIED TYPE: ICD-10-CM

## 2024-06-13 DIAGNOSIS — Z11.3 SCREENING EXAMINATION FOR STI: ICD-10-CM

## 2024-06-13 DIAGNOSIS — D57.3 SICKLE CELL TRAIT: ICD-10-CM

## 2024-06-13 DIAGNOSIS — Z00.00 ANNUAL PHYSICAL EXAM: Primary | ICD-10-CM

## 2024-06-13 DIAGNOSIS — L30.9 HAND DERMATITIS: ICD-10-CM

## 2024-06-13 LAB
ALBUMIN SERPL BCP-MCNC: 4.1 G/DL (ref 3.5–5.2)
ALP SERPL-CCNC: 45 U/L (ref 55–135)
ALT SERPL W/O P-5'-P-CCNC: 12 U/L (ref 10–44)
ANION GAP SERPL CALC-SCNC: 10 MMOL/L (ref 8–16)
AST SERPL-CCNC: 13 U/L (ref 10–40)
BASOPHILS # BLD AUTO: 0.04 K/UL (ref 0–0.2)
BASOPHILS NFR BLD: 0.9 % (ref 0–1.9)
BILIRUB SERPL-MCNC: 0.7 MG/DL (ref 0.1–1)
BUN SERPL-MCNC: 14 MG/DL (ref 6–20)
CALCIUM SERPL-MCNC: 10.3 MG/DL (ref 8.7–10.5)
CHLORIDE SERPL-SCNC: 104 MMOL/L (ref 95–110)
CHOLEST SERPL-MCNC: 143 MG/DL (ref 120–199)
CHOLEST/HDLC SERPL: 2.6 {RATIO} (ref 2–5)
CO2 SERPL-SCNC: 27 MMOL/L (ref 23–29)
CREAT SERPL-MCNC: 1.3 MG/DL (ref 0.5–1.4)
DIFFERENTIAL METHOD BLD: ABNORMAL
EOSINOPHIL # BLD AUTO: 0.3 K/UL (ref 0–0.5)
EOSINOPHIL NFR BLD: 5.9 % (ref 0–8)
ERYTHROCYTE [DISTWIDTH] IN BLOOD BY AUTOMATED COUNT: 15.6 % (ref 11.5–14.5)
EST. GFR  (NO RACE VARIABLE): >60 ML/MIN/1.73 M^2
ESTIMATED AVG GLUCOSE: 117 MG/DL (ref 68–131)
GLUCOSE SERPL-MCNC: 100 MG/DL (ref 70–110)
HBA1C MFR BLD: 5.7 % (ref 4–5.6)
HCT VFR BLD AUTO: 44.2 % (ref 40–54)
HDLC SERPL-MCNC: 54 MG/DL (ref 40–75)
HDLC SERPL: 37.8 % (ref 20–50)
HGB BLD-MCNC: 13.5 G/DL (ref 14–18)
HIV 1+2 AB+HIV1 P24 AG SERPL QL IA: NORMAL
IMM GRANULOCYTES # BLD AUTO: 0.01 K/UL (ref 0–0.04)
IMM GRANULOCYTES NFR BLD AUTO: 0.2 % (ref 0–0.5)
LDLC SERPL CALC-MCNC: 81.8 MG/DL (ref 63–159)
LYMPHOCYTES # BLD AUTO: 1.4 K/UL (ref 1–4.8)
LYMPHOCYTES NFR BLD: 30.8 % (ref 18–48)
MCH RBC QN AUTO: 23.2 PG (ref 27–31)
MCHC RBC AUTO-ENTMCNC: 30.5 G/DL (ref 32–36)
MCV RBC AUTO: 76 FL (ref 82–98)
MONOCYTES # BLD AUTO: 0.5 K/UL (ref 0.3–1)
MONOCYTES NFR BLD: 10.8 % (ref 4–15)
NEUTROPHILS # BLD AUTO: 2.4 K/UL (ref 1.8–7.7)
NEUTROPHILS NFR BLD: 51.4 % (ref 38–73)
NONHDLC SERPL-MCNC: 89 MG/DL
NRBC BLD-RTO: 0 /100 WBC
PLATELET # BLD AUTO: 147 K/UL (ref 150–450)
PMV BLD AUTO: ABNORMAL FL (ref 9.2–12.9)
POTASSIUM SERPL-SCNC: 3.9 MMOL/L (ref 3.5–5.1)
PROT SERPL-MCNC: 7.2 G/DL (ref 6–8.4)
RBC # BLD AUTO: 5.82 M/UL (ref 4.6–6.2)
SODIUM SERPL-SCNC: 141 MMOL/L (ref 136–145)
TREPONEMA PALLIDUM IGG+IGM AB [PRESENCE] IN SERUM OR PLASMA BY IMMUNOASSAY: NONREACTIVE
TRIGL SERPL-MCNC: 36 MG/DL (ref 30–150)
TSH SERPL DL<=0.005 MIU/L-ACNC: 0.86 UIU/ML (ref 0.4–4)
WBC # BLD AUTO: 4.61 K/UL (ref 3.9–12.7)

## 2024-06-13 PROCEDURE — 86593 SYPHILIS TEST NON-TREP QUANT: CPT | Performed by: PHYSICIAN ASSISTANT

## 2024-06-13 PROCEDURE — 3074F SYST BP LT 130 MM HG: CPT | Mod: CPTII,S$GLB,, | Performed by: PHYSICIAN ASSISTANT

## 2024-06-13 PROCEDURE — 85025 COMPLETE CBC W/AUTO DIFF WBC: CPT | Performed by: PHYSICIAN ASSISTANT

## 2024-06-13 PROCEDURE — 80061 LIPID PANEL: CPT | Performed by: PHYSICIAN ASSISTANT

## 2024-06-13 PROCEDURE — 36415 COLL VENOUS BLD VENIPUNCTURE: CPT | Performed by: PHYSICIAN ASSISTANT

## 2024-06-13 PROCEDURE — 84443 ASSAY THYROID STIM HORMONE: CPT | Performed by: PHYSICIAN ASSISTANT

## 2024-06-13 PROCEDURE — 99999 PR PBB SHADOW E&M-EST. PATIENT-LVL III: CPT | Mod: PBBFAC,,, | Performed by: PHYSICIAN ASSISTANT

## 2024-06-13 PROCEDURE — 80053 COMPREHEN METABOLIC PANEL: CPT | Performed by: PHYSICIAN ASSISTANT

## 2024-06-13 PROCEDURE — 99395 PREV VISIT EST AGE 18-39: CPT | Mod: S$GLB,,, | Performed by: PHYSICIAN ASSISTANT

## 2024-06-13 PROCEDURE — 3008F BODY MASS INDEX DOCD: CPT | Mod: CPTII,S$GLB,, | Performed by: PHYSICIAN ASSISTANT

## 2024-06-13 PROCEDURE — 1159F MED LIST DOCD IN RCRD: CPT | Mod: CPTII,S$GLB,, | Performed by: PHYSICIAN ASSISTANT

## 2024-06-13 PROCEDURE — 83036 HEMOGLOBIN GLYCOSYLATED A1C: CPT | Performed by: PHYSICIAN ASSISTANT

## 2024-06-13 PROCEDURE — 3078F DIAST BP <80 MM HG: CPT | Mod: CPTII,S$GLB,, | Performed by: PHYSICIAN ASSISTANT

## 2024-06-13 PROCEDURE — 1160F RVW MEDS BY RX/DR IN RCRD: CPT | Mod: CPTII,S$GLB,, | Performed by: PHYSICIAN ASSISTANT

## 2024-06-13 PROCEDURE — 87389 HIV-1 AG W/HIV-1&-2 AB AG IA: CPT | Performed by: PHYSICIAN ASSISTANT

## 2024-06-13 RX ORDER — TRIAMCINOLONE ACETONIDE 1 MG/G
OINTMENT TOPICAL 2 TIMES DAILY
Qty: 80 G | Refills: 1 | Status: SHIPPED | OUTPATIENT
Start: 2024-06-13

## 2024-06-13 NOTE — PROGRESS NOTES
"Subjective     Patient ID: Nirmal Owens is a 39 y.o. male.    Chief Complaint: Annual Exam    HPI    Established pt of Hermes Rankin MD     Here for annual exam.     Left hand dermatitis, dry scaling, has eczema, notice area to neck and chest as well, Using aquaphor    Shift-work overnight, occ fatigue, planning to change to a more consistent schedule    Working out regularly, weight lifting    Past Medical History:   Diagnosis Date    Allergy     Asthma     Eczema     Urticaria      Social History     Tobacco Use    Smoking status: Never    Smokeless tobacco: Never   Substance Use Topics    Alcohol use: Yes     Alcohol/week: 0.0 standard drinks of alcohol     Comment: occasionally    Drug use: No     Review of patient's allergies indicates:  No Known Allergies    Review of Systems   Constitutional:  Positive for fatigue. Negative for chills, fever and unexpected weight change.   Respiratory:  Negative for cough, shortness of breath and wheezing.    Cardiovascular:  Negative for leg swelling.   Gastrointestinal:  Negative for abdominal pain, nausea and vomiting.   Integumentary:  Positive for rash.   Neurological:  Negative for weakness and headaches.          Objective  /66 (BP Location: Left arm, Patient Position: Sitting, BP Method: Medium (Manual))   Pulse (!) 53   Ht 6' 1" (1.854 m)   Wt 73.2 kg (161 lb 6 oz)   SpO2 99%   BMI 21.29 kg/m²       Physical Exam  Vitals reviewed.   Constitutional:       General: He is not in acute distress.     Appearance: He is well-developed.   HENT:      Head: Normocephalic and atraumatic.      Right Ear: Tympanic membrane, ear canal and external ear normal.      Left Ear: Tympanic membrane, ear canal and external ear normal.   Cardiovascular:      Rate and Rhythm: Normal rate and regular rhythm.      Heart sounds: No murmur heard.  Pulmonary:      Effort: Pulmonary effort is normal.      Breath sounds: Normal breath sounds. No wheezing or rales.   Abdominal:      " General: Bowel sounds are normal.      Palpations: Abdomen is soft.      Tenderness: There is no abdominal tenderness.   Musculoskeletal:      Right lower leg: No edema.      Left lower leg: No edema.   Skin:     General: Skin is warm and dry.      Findings: Rash (scaly, hyperpigemented lesion to neck, left hand) present.   Neurological:      Mental Status: He is alert.   Psychiatric:         Mood and Affect: Mood normal.          Assessment and Plan     1. Annual physical exam  HM reviewed and updated   -     CBC Auto Differential; Future; Expected date: 06/13/2024  -     Comprehensive Metabolic Panel; Future; Expected date: 06/13/2024  -     TSH; Future; Expected date: 06/13/2024  -     Lipid Panel; Future; Expected date: 06/13/2024  -     Hemoglobin A1C; Future; Expected date: 06/13/2024    2. Hand dermatitis  -     triamcinolone acetonide 0.1% (KENALOG) 0.1 % ointment; Apply topically 2 (two) times daily. For eczema  Dispense: 80 g; Refill: 1    3. Screening examination for STI  -     C. trachomatis/N. gonorrhoeae by AMP DNA; Future; Expected date: 06/13/2024  -     Treponema Pallidium Antibodies IgG, IgM; Future; Expected date: 06/13/2024  -     HIV 1/2 Ag/Ab (4th Gen); Future; Expected date: 06/13/2024    4. Eczema, unspecified type  -     triamcinolone acetonide 0.1% (KENALOG) 0.1 % ointment; Apply topically 2 (two) times daily. For eczema  Dispense: 80 g; Refill: 1    5. Sickle cell trait  No concerns  Check CBC as above    Annabel Hernadez PA-C

## 2024-12-12 ENCOUNTER — PATIENT MESSAGE (OUTPATIENT)
Dept: INTERNAL MEDICINE | Facility: CLINIC | Age: 39
End: 2024-12-12

## 2024-12-12 ENCOUNTER — OFFICE VISIT (OUTPATIENT)
Dept: INTERNAL MEDICINE | Facility: CLINIC | Age: 39
End: 2024-12-12
Payer: COMMERCIAL

## 2024-12-12 ENCOUNTER — LAB VISIT (OUTPATIENT)
Dept: LAB | Facility: HOSPITAL | Age: 39
End: 2024-12-12
Payer: COMMERCIAL

## 2024-12-12 VITALS
HEART RATE: 81 BPM | BODY MASS INDEX: 20.92 KG/M2 | WEIGHT: 157.88 LBS | HEIGHT: 73 IN | DIASTOLIC BLOOD PRESSURE: 64 MMHG | SYSTOLIC BLOOD PRESSURE: 126 MMHG | OXYGEN SATURATION: 95 %

## 2024-12-12 DIAGNOSIS — E55.9 VITAMIN D INSUFFICIENCY: ICD-10-CM

## 2024-12-12 DIAGNOSIS — Z11.3 SCREENING EXAMINATION FOR STI: ICD-10-CM

## 2024-12-12 DIAGNOSIS — R73.03 PREDIABETES: ICD-10-CM

## 2024-12-12 DIAGNOSIS — R73.03 PREDIABETES: Primary | ICD-10-CM

## 2024-12-12 LAB
25(OH)D3+25(OH)D2 SERPL-MCNC: 30 NG/ML (ref 30–96)
ANION GAP SERPL CALC-SCNC: 8 MMOL/L (ref 8–16)
BUN SERPL-MCNC: 18 MG/DL (ref 6–20)
CALCIUM SERPL-MCNC: 9.1 MG/DL (ref 8.7–10.5)
CHLORIDE SERPL-SCNC: 107 MMOL/L (ref 95–110)
CO2 SERPL-SCNC: 26 MMOL/L (ref 23–29)
CREAT SERPL-MCNC: 1.4 MG/DL (ref 0.5–1.4)
EST. GFR  (NO RACE VARIABLE): >60 ML/MIN/1.73 M^2
ESTIMATED AVG GLUCOSE: 117 MG/DL (ref 68–131)
GLUCOSE SERPL-MCNC: 98 MG/DL (ref 70–110)
HAV IGM SERPL QL IA: NORMAL
HBA1C MFR BLD: 5.7 % (ref 4–5.6)
HBV CORE IGM SERPL QL IA: NORMAL
HBV SURFACE AG SERPL QL IA: NORMAL
HCV AB SERPL QL IA: NORMAL
HIV 1+2 AB+HIV1 P24 AG SERPL QL IA: NORMAL
POTASSIUM SERPL-SCNC: 3.9 MMOL/L (ref 3.5–5.1)
SODIUM SERPL-SCNC: 141 MMOL/L (ref 136–145)
TREPONEMA PALLIDUM IGG+IGM AB [PRESENCE] IN SERUM OR PLASMA BY IMMUNOASSAY: NONREACTIVE

## 2024-12-12 PROCEDURE — 3074F SYST BP LT 130 MM HG: CPT | Mod: CPTII,S$GLB,, | Performed by: PHYSICIAN ASSISTANT

## 2024-12-12 PROCEDURE — 99214 OFFICE O/P EST MOD 30 MIN: CPT | Mod: S$GLB,,, | Performed by: PHYSICIAN ASSISTANT

## 2024-12-12 PROCEDURE — 80048 BASIC METABOLIC PNL TOTAL CA: CPT | Performed by: PHYSICIAN ASSISTANT

## 2024-12-12 PROCEDURE — 83036 HEMOGLOBIN GLYCOSYLATED A1C: CPT | Performed by: PHYSICIAN ASSISTANT

## 2024-12-12 PROCEDURE — 3044F HG A1C LEVEL LT 7.0%: CPT | Mod: CPTII,S$GLB,, | Performed by: PHYSICIAN ASSISTANT

## 2024-12-12 PROCEDURE — 87389 HIV-1 AG W/HIV-1&-2 AB AG IA: CPT | Performed by: PHYSICIAN ASSISTANT

## 2024-12-12 PROCEDURE — 80074 ACUTE HEPATITIS PANEL: CPT | Performed by: PHYSICIAN ASSISTANT

## 2024-12-12 PROCEDURE — 82306 VITAMIN D 25 HYDROXY: CPT | Performed by: PHYSICIAN ASSISTANT

## 2024-12-12 PROCEDURE — 99999 PR PBB SHADOW E&M-EST. PATIENT-LVL III: CPT | Mod: PBBFAC,,, | Performed by: PHYSICIAN ASSISTANT

## 2024-12-12 PROCEDURE — 36415 COLL VENOUS BLD VENIPUNCTURE: CPT | Performed by: PHYSICIAN ASSISTANT

## 2024-12-12 PROCEDURE — 3008F BODY MASS INDEX DOCD: CPT | Mod: CPTII,S$GLB,, | Performed by: PHYSICIAN ASSISTANT

## 2024-12-12 PROCEDURE — 3078F DIAST BP <80 MM HG: CPT | Mod: CPTII,S$GLB,, | Performed by: PHYSICIAN ASSISTANT

## 2024-12-12 PROCEDURE — 86593 SYPHILIS TEST NON-TREP QUANT: CPT | Performed by: PHYSICIAN ASSISTANT

## 2024-12-20 ENCOUNTER — NUTRITION (OUTPATIENT)
Dept: NUTRITION | Facility: CLINIC | Age: 39
End: 2024-12-20
Payer: COMMERCIAL

## 2024-12-20 VITALS — BODY MASS INDEX: 21.54 KG/M2 | HEIGHT: 73 IN | WEIGHT: 162.5 LBS

## 2024-12-20 DIAGNOSIS — Z71.3 DIETARY COUNSELING: ICD-10-CM

## 2024-12-20 DIAGNOSIS — R73.03 PREDIABETES: Primary | ICD-10-CM

## 2024-12-20 PROCEDURE — 99999 PR PBB SHADOW E&M-EST. PATIENT-LVL III: CPT | Mod: PBBFAC,,,

## 2024-12-20 NOTE — PROGRESS NOTES
"Referring Physician:Annabel Hernadez PA-C     Reason for visit:  Chief Complaint   Patient presents with    prediabetes    Nutrition Counseling      Initial Visit    :1985     Allergies Reviewed  Meds Reviewed    Anthropometrics  Weight:73.7 kg (162 lb 7.7 oz)  Height:6' 1" (1.854 m)  BMI:Body mass index is 21.44 kg/m².   IBW: ***  +/-10%    Meds:  Outpatient Medications Prior to Visit   Medication Sig Dispense Refill    cetirizine (ZYRTEC) 10 MG tablet Take 1 tablet (10 mg total) by mouth once daily. (Patient not taking: No sig reported) 30 tablet 11    cholecalciferol, vitD3,/vit K2 (VITAMIN D3-VITAMIN K2 ORAL) Take by mouth.      clindamycin phosphate 1% (CLINDAGEL) 1 % gel Apply topically 2 (two) times daily. (Patient not taking: Reported on 2024) 30 g 0    triamcinolone acetonide 0.1% (KENALOG) 0.1 % ointment Apply topically 2 (two) times daily. For eczema (Patient not taking: Reported on 2024) 80 g 1     No facility-administered medications prior to visit.       Food/Drug Interactions Noted:  ***    Vitamins/Supplements/Herbs:  ***    Labs: ***     Nutrition Prescription: ***Kcals/day( ***kcal/kg IBW),  ***g protein( ***g/kg IBW)     Support System:  ***    Diet Hx: ***     Breakfast: ***  Lunch: ***  Dinner: ***    Current activity level and/or physical limitations:  ***    Motivation to make changes/anticipated barriers and/or expected adherence:  ***    Nutrition-Focus Physical Findings:  ***    Assessment: ***    Nutrition Diagnosis: ***    Recommendations: ***    Strategies Implemented:  ***    Consultation Time:{NUMBERS 0-100:48278} minutes.  Communicated with referring healthcare provider:  Consult note available in pt's Epic chart per MD discretion  Follow Up:{NUMBERS 0 - 12:43970} months.                  "

## 2024-12-20 NOTE — PROGRESS NOTES
"Referring Physician:Annabel Hernadez PA-C     Reason for visit:  Chief Complaint   Patient presents with    prediabetes    Nutrition Counseling      Initial Visit    :1985     Allergies Reviewed  Meds Reviewed    Anthropometrics  Weight:73.7 kg (162 lb 7.7 oz)  Height:6' 1" (1.854 m)  BMI:Body mass index is 21.44 kg/m².   IBW: 79.9 kg  +/-10%    Meds:  Outpatient Medications Prior to Visit   Medication Sig Dispense Refill    cetirizine (ZYRTEC) 10 MG tablet Take 1 tablet (10 mg total) by mouth once daily. (Patient not taking: No sig reported) 30 tablet 11    cholecalciferol, vitD3,/vit K2 (VITAMIN D3-VITAMIN K2 ORAL) Take by mouth.      clindamycin phosphate 1% (CLINDAGEL) 1 % gel Apply topically 2 (two) times daily. (Patient not taking: Reported on 2024) 30 g 0    triamcinolone acetonide 0.1% (KENALOG) 0.1 % ointment Apply topically 2 (two) times daily. For eczema (Patient not taking: Reported on 2024) 80 g 1     No facility-administered medications prior to visit.       Food/Drug Interactions Noted:  n/a    Vitamins/Supplements/Herbs:  Vit D    Labs: HgbA1c  5.7     Nutrition Prescription: 2397 Kcals/day( 30 kcal/kg IBW),  64-80 g protein( 0.8-1.0 g/kg IBW)     Support System:   pt lives alone; does his own grocery shopping and some cooking.      Diet Hx:  pt states two weeks ago he met with a dietitian online and was given advice to follow the My Plate model, which was reinforced with today's handouts and discussion.  Breakfast is inconsistent; has started adding spinach as a side with his eggs and grits.  Primary beverage has been water; bottle of lemonade with snack last night.    Breakfast: around 9 am: fried eggs, grits, side of spinach.  Water.  Lunch:  around 2 pm:  burger on bun and fries from [employer] or [employer].  Water.  Dinner:  last night had burger on bun and fries at [employer].  Water  Snack:  Cesilia butter cookies, bottle of lemonade    Current activity level and/or " physical limitations:  weight training 4 times/week; no cardio    Motivation to make changes/anticipated barriers and/or expected adherence:  no barriers to adherence identified; pt seems very motivated to lower his A1c    Nutrition-Focus Physical Findings:  pt appears well nourished    Assessment:  pt very attentive and asked numerous relevant questions about foods/beverages recommended and to avoid; reading food labels; sample meal plan and menus; portion control; grocery shopping, eating out and cooking tips; healthy snacks; role of exercise in blood glucose management.  All of his questions were answered, and he verbalized understanding of information.    Nutrition Diagnosis: Food- and nutrition-related knowledge deficit RT lack of prior exposure to information AEB dx prediabetes    Recommendations:  Carbohydrate controlled, low fat, high fiber diet.  Exercise goal:  30 minutes cardio, 3-5 days/week.  Handouts provided & reviewed:  Carbohydrate Counting for People With Diabetes; Label Reading Tips for People with Diabetes; 60-75 gm Carbohydrate Meal Plan and 15 gm carb serving/snack with no more than 3 snacks/day; Servings of Carbohydrates for Meal Planning; My Plate Planner; Snack List ([facility]); 2,000 calorie Meal Plan; What is Prediabetes    Strategies Implemented:  portion control of carbohydrates; read food labels; include protein with snacks; include some cardio in exercise regimen    Consultation Time:45 minutes.  Communicated with referring healthcare provider:  Consult note available in pt's Epic chart per MD discretion  Follow Up:  Pt provided with dietitian contact information and advised to contact me with questions or make future appointment if further intervention needed.

## 2024-12-20 NOTE — PATIENT INSTRUCTIONS
Carbohydrate controlled, low fat, high fiber diet.  Exercise goal:  30 minutes cardio, 3-5 days/week.   Yes

## 2025-01-08 ENCOUNTER — PATIENT MESSAGE (OUTPATIENT)
Dept: INTERNAL MEDICINE | Facility: CLINIC | Age: 40
End: 2025-01-08
Payer: COMMERCIAL

## 2025-03-11 ENCOUNTER — LAB VISIT (OUTPATIENT)
Dept: LAB | Facility: HOSPITAL | Age: 40
End: 2025-03-11
Payer: COMMERCIAL

## 2025-03-11 DIAGNOSIS — D57.3 SICKLE CELL TRAIT: ICD-10-CM

## 2025-03-11 DIAGNOSIS — R73.03 PREDIABETES: ICD-10-CM

## 2025-03-11 LAB
ESTIMATED AVG GLUCOSE: 131 MG/DL (ref 68–131)
HBA1C MFR BLD: 6.2 % (ref 4–5.6)

## 2025-03-11 PROCEDURE — 36415 COLL VENOUS BLD VENIPUNCTURE: CPT | Performed by: PHYSICIAN ASSISTANT

## 2025-03-11 PROCEDURE — 83036 HEMOGLOBIN GLYCOSYLATED A1C: CPT | Performed by: PHYSICIAN ASSISTANT

## 2025-03-11 PROCEDURE — 82985 ASSAY OF GLYCATED PROTEIN: CPT | Performed by: PHYSICIAN ASSISTANT

## 2025-03-15 LAB — FRUCTOSAMINE SERPL-SCNC: 284 UMOL /L (ref 151–300)

## 2025-04-30 ENCOUNTER — OFFICE VISIT (OUTPATIENT)
Dept: INTERNAL MEDICINE | Facility: CLINIC | Age: 40
End: 2025-04-30
Payer: COMMERCIAL

## 2025-04-30 ENCOUNTER — LAB VISIT (OUTPATIENT)
Dept: LAB | Facility: HOSPITAL | Age: 40
End: 2025-04-30
Payer: COMMERCIAL

## 2025-04-30 ENCOUNTER — PATIENT MESSAGE (OUTPATIENT)
Dept: INTERNAL MEDICINE | Facility: CLINIC | Age: 40
End: 2025-04-30

## 2025-04-30 ENCOUNTER — TELEPHONE (OUTPATIENT)
Dept: INTERNAL MEDICINE | Facility: CLINIC | Age: 40
End: 2025-04-30
Payer: COMMERCIAL

## 2025-04-30 VITALS
HEART RATE: 61 BPM | DIASTOLIC BLOOD PRESSURE: 68 MMHG | WEIGHT: 157.19 LBS | BODY MASS INDEX: 20.74 KG/M2 | OXYGEN SATURATION: 99 % | SYSTOLIC BLOOD PRESSURE: 110 MMHG

## 2025-04-30 DIAGNOSIS — J02.9 SORE THROAT: ICD-10-CM

## 2025-04-30 DIAGNOSIS — Z11.3 ROUTINE SCREENING FOR STI (SEXUALLY TRANSMITTED INFECTION): ICD-10-CM

## 2025-04-30 DIAGNOSIS — R73.03 PREDIABETES: ICD-10-CM

## 2025-04-30 DIAGNOSIS — Z11.3 ROUTINE SCREENING FOR STI (SEXUALLY TRANSMITTED INFECTION): Primary | ICD-10-CM

## 2025-04-30 DIAGNOSIS — Z20.818 EXPOSURE TO STREP THROAT: ICD-10-CM

## 2025-04-30 LAB
CTP QC/QA: YES
HIV 1+2 AB+HIV1 P24 AG SERPL QL IA: NORMAL
MOLECULAR STREP A: NEGATIVE
T PALLIDUM IGG+IGM SER QL: NORMAL

## 2025-04-30 PROCEDURE — 99999 PR PBB SHADOW E&M-EST. PATIENT-LVL III: CPT | Mod: PBBFAC,,, | Performed by: PHYSICIAN ASSISTANT

## 2025-04-30 PROCEDURE — 3074F SYST BP LT 130 MM HG: CPT | Mod: CPTII,S$GLB,, | Performed by: PHYSICIAN ASSISTANT

## 2025-04-30 PROCEDURE — 1160F RVW MEDS BY RX/DR IN RCRD: CPT | Mod: CPTII,S$GLB,, | Performed by: PHYSICIAN ASSISTANT

## 2025-04-30 PROCEDURE — 87651 STREP A DNA AMP PROBE: CPT | Mod: QW,S$GLB,, | Performed by: PHYSICIAN ASSISTANT

## 2025-04-30 PROCEDURE — 87591 N.GONORRHOEAE DNA AMP PROB: CPT

## 2025-04-30 PROCEDURE — 99214 OFFICE O/P EST MOD 30 MIN: CPT | Mod: S$GLB,,, | Performed by: PHYSICIAN ASSISTANT

## 2025-04-30 PROCEDURE — 86593 SYPHILIS TEST NON-TREP QUANT: CPT

## 2025-04-30 PROCEDURE — 3044F HG A1C LEVEL LT 7.0%: CPT | Mod: CPTII,S$GLB,, | Performed by: PHYSICIAN ASSISTANT

## 2025-04-30 PROCEDURE — 87389 HIV-1 AG W/HIV-1&-2 AB AG IA: CPT

## 2025-04-30 PROCEDURE — 1159F MED LIST DOCD IN RCRD: CPT | Mod: CPTII,S$GLB,, | Performed by: PHYSICIAN ASSISTANT

## 2025-04-30 PROCEDURE — 3008F BODY MASS INDEX DOCD: CPT | Mod: CPTII,S$GLB,, | Performed by: PHYSICIAN ASSISTANT

## 2025-04-30 PROCEDURE — 36415 COLL VENOUS BLD VENIPUNCTURE: CPT

## 2025-04-30 PROCEDURE — 3078F DIAST BP <80 MM HG: CPT | Mod: CPTII,S$GLB,, | Performed by: PHYSICIAN ASSISTANT

## 2025-04-30 NOTE — TELEPHONE ENCOUNTER
Patient called for scheduling with diabetic provider. Patient scheduled soonest available, patient voiced understanding of appointment time and date.  ----- Message from Annabel Hernadez PA-C sent at 4/30/2025  9:14 AM CDT -----  Bjorn Anguiano and staff. It was great to learn about your expertise at the [facility] meeting yesterday. This patient is interested in visiting with you for prediabetes.

## 2025-04-30 NOTE — Clinical Note
Bjorn Anguiano and staff. It was great to learn about your expertise at the [facility] meeting yesterday. This patient is interested in visiting with you for prediabetes.

## 2025-04-30 NOTE — PATIENT INSTRUCTIONS
Carly RIVERS    I will refer your name to our specialist that's helping manage prediabetes    Repeat A1c in June, I will send reminder.

## 2025-04-30 NOTE — PROGRESS NOTES
Subjective     Patient ID: Nirmal Owens is a 40 y.o. male with prediabetes.     Chief Complaint: Follow-up    HPI    Established pt of Hermes Rankin MD    Here for STI screening. No  s/s or exposure concerns.     PreDM: A1c 6.2, fructosamine checked given his sickle cell anemia and it is consistent with A1c of approx 6.4, He does note he was drinking a lot of ginger ale but has since stopped. He is quite concerned about developing diabetes. He plans to adopt a plant based diet. He has completed nutrition consultation. Not interested in pharmacotherapy at this time.     C/o sore throat, only in the am, his dtr had strep throat last week. No fevers or trouble swallowing. Sore throat typical resolve in the am. ?mouth breather when sleeping    Past Medical History:   Diagnosis Date    Allergy     Asthma     Eczema     Urticaria      Social History[1]    Review of patient's allergies indicates:  No Known Allergies    Review of Systems   Constitutional:  Negative for chills, fever and unexpected weight change.   Respiratory:  Negative for cough and shortness of breath.    Cardiovascular:  Negative for chest pain and leg swelling.   Gastrointestinal:  Negative for abdominal pain, nausea and vomiting.   Integumentary:  Negative for rash.   Neurological:  Negative for weakness and headaches.          Objective  /68   Pulse 61   Wt 71.3 kg (157 lb 3 oz)   SpO2 99%   BMI 20.74 kg/m²       Physical Exam  Vitals reviewed.   Constitutional:       General: He is not in acute distress.     Appearance: He is well-developed.   HENT:      Head: Normocephalic and atraumatic.      Mouth/Throat:      Mouth: Mucous membranes are moist.      Pharynx: Oropharynx is clear. No oropharyngeal exudate or posterior oropharyngeal erythema.   Cardiovascular:      Rate and Rhythm: Normal rate and regular rhythm.      Heart sounds: No murmur heard.  Pulmonary:      Effort: Pulmonary effort is normal.      Breath sounds: Normal breath  sounds. No wheezing or rales.   Musculoskeletal:      Right lower leg: No edema.      Left lower leg: No edema.   Lymphadenopathy:      Cervical: No cervical adenopathy.   Skin:     General: Skin is warm and dry.      Findings: No rash.   Neurological:      Mental Status: He is alert and oriented to person, place, and time.   Psychiatric:         Mood and Affect: Mood normal.            Assessment and Plan     1. Routine screening for STI (sexually transmitted infection)  -     C. trachomatis/N. gonorrhoeae by AMP DNA; Future; Expected date: 04/30/2025  -     HIV 1/2 Ag/Ab (4th Gen); Future; Expected date: 04/30/2025  -     Treponema Pallidium Antibodies IgG, IgM; Future; Expected date: 04/30/2025    2. Sore throat  3. Exposure to strep throat  -     POCT Strep A, Molecular (negative)  -     Symptomatic care discussed (oral antihistamine and flonase)    4. Prediabetes  Lab Results   Component Value Date    HGBA1C 6.2 (H) 03/11/2025     Fructosamine 284   We discussed dietary changes, metformin, CGM (Carly) and consult with our specialist NP Silvio Hancock.  Repeat A1c in June      Annabel Hernadez PA-C       [1]   Social History  Tobacco Use    Smoking status: Never    Smokeless tobacco: Never   Substance Use Topics    Alcohol use: Yes     Alcohol/week: 0.0 standard drinks of alcohol     Comment: occasionally    Drug use: No

## 2025-05-02 LAB
C TRACH DNA SPEC QL NAA+PROBE: NOT DETECTED
CTGC SOURCE (OHS) ORD-325: NORMAL
N GONORRHOEA DNA UR QL NAA+PROBE: NOT DETECTED

## 2025-05-05 ENCOUNTER — RESULTS FOLLOW-UP (OUTPATIENT)
Dept: INTERNAL MEDICINE | Facility: CLINIC | Age: 40
End: 2025-05-05

## 2025-06-05 ENCOUNTER — RESULTS FOLLOW-UP (OUTPATIENT)
Dept: INTERNAL MEDICINE | Facility: CLINIC | Age: 40
End: 2025-06-05

## 2025-06-05 ENCOUNTER — OFFICE VISIT (OUTPATIENT)
Dept: INTERNAL MEDICINE | Facility: CLINIC | Age: 40
End: 2025-06-05
Payer: COMMERCIAL

## 2025-06-05 ENCOUNTER — LAB VISIT (OUTPATIENT)
Dept: LAB | Facility: HOSPITAL | Age: 40
End: 2025-06-05
Payer: COMMERCIAL

## 2025-06-05 VITALS
SYSTOLIC BLOOD PRESSURE: 112 MMHG | HEIGHT: 61 IN | BODY MASS INDEX: 28.93 KG/M2 | WEIGHT: 153.25 LBS | HEART RATE: 64 BPM | DIASTOLIC BLOOD PRESSURE: 66 MMHG | OXYGEN SATURATION: 97 %

## 2025-06-05 DIAGNOSIS — R73.03 PREDIABETES: Primary | ICD-10-CM

## 2025-06-05 DIAGNOSIS — R53.83 FATIGUE, UNSPECIFIED TYPE: ICD-10-CM

## 2025-06-05 DIAGNOSIS — R73.03 PREDIABETES: ICD-10-CM

## 2025-06-05 DIAGNOSIS — D57.3 SICKLE CELL TRAIT: ICD-10-CM

## 2025-06-05 LAB
ABSOLUTE EOSINOPHIL (OHS): 0.29 K/UL
ABSOLUTE MONOCYTE (OHS): 0.48 K/UL (ref 0.3–1)
ABSOLUTE NEUTROPHIL COUNT (OHS): 2.19 K/UL (ref 1.8–7.7)
ALBUMIN SERPL BCP-MCNC: 4.4 G/DL (ref 3.5–5.2)
ALP SERPL-CCNC: 40 UNIT/L (ref 40–150)
ALT SERPL W/O P-5'-P-CCNC: 12 UNIT/L (ref 10–44)
ANION GAP (OHS): 7 MMOL/L (ref 8–16)
AST SERPL-CCNC: 13 UNIT/L (ref 11–45)
BASOPHILS # BLD AUTO: 0.05 K/UL
BASOPHILS NFR BLD AUTO: 1.1 %
BILIRUB SERPL-MCNC: 0.6 MG/DL (ref 0.1–1)
BUN SERPL-MCNC: 17 MG/DL (ref 6–20)
CALCIUM SERPL-MCNC: 9.1 MG/DL (ref 8.7–10.5)
CHLORIDE SERPL-SCNC: 108 MMOL/L (ref 95–110)
CO2 SERPL-SCNC: 27 MMOL/L (ref 23–29)
CREAT SERPL-MCNC: 1.4 MG/DL (ref 0.5–1.4)
EAG (OHS): 120 MG/DL (ref 68–131)
ERYTHROCYTE [DISTWIDTH] IN BLOOD BY AUTOMATED COUNT: 16.3 % (ref 11.5–14.5)
FERRITIN SERPL-MCNC: 171 NG/ML (ref 20–300)
GFR SERPLBLD CREATININE-BSD FMLA CKD-EPI: >60 ML/MIN/1.73/M2
GLUCOSE SERPL-MCNC: 98 MG/DL (ref 70–110)
HBA1C MFR BLD: 5.8 % (ref 4–5.6)
HCT VFR BLD AUTO: 41.7 % (ref 40–54)
HGB BLD-MCNC: 12.7 GM/DL (ref 14–18)
IMM GRANULOCYTES # BLD AUTO: 0.04 K/UL (ref 0–0.04)
IMM GRANULOCYTES NFR BLD AUTO: 0.9 % (ref 0–0.5)
LYMPHOCYTES # BLD AUTO: 1.6 K/UL (ref 1–4.8)
MCH RBC QN AUTO: 22.6 PG (ref 27–31)
MCHC RBC AUTO-ENTMCNC: 30.5 G/DL (ref 32–36)
MCV RBC AUTO: 74 FL (ref 82–98)
NUCLEATED RBC (/100WBC) (OHS): 0 /100 WBC
PLATELET # BLD AUTO: 150 K/UL (ref 150–450)
PMV BLD AUTO: ABNORMAL FL
POTASSIUM SERPL-SCNC: 4.2 MMOL/L (ref 3.5–5.1)
PROT SERPL-MCNC: 7.3 GM/DL (ref 6–8.4)
RBC # BLD AUTO: 5.63 M/UL (ref 4.6–6.2)
RELATIVE EOSINOPHIL (OHS): 6.2 %
RELATIVE LYMPHOCYTE (OHS): 34.4 % (ref 18–48)
RELATIVE MONOCYTE (OHS): 10.3 % (ref 4–15)
RELATIVE NEUTROPHIL (OHS): 47.1 % (ref 38–73)
SODIUM SERPL-SCNC: 142 MMOL/L (ref 136–145)
TSH SERPL-ACNC: 0.89 UIU/ML (ref 0.4–4)
VIT B12 SERPL-MCNC: 1137 PG/ML (ref 210–950)
WBC # BLD AUTO: 4.65 K/UL (ref 3.9–12.7)

## 2025-06-05 PROCEDURE — 83036 HEMOGLOBIN GLYCOSYLATED A1C: CPT

## 2025-06-05 PROCEDURE — 84443 ASSAY THYROID STIM HORMONE: CPT

## 2025-06-05 PROCEDURE — 84460 ALANINE AMINO (ALT) (SGPT): CPT

## 2025-06-05 PROCEDURE — 3008F BODY MASS INDEX DOCD: CPT | Mod: CPTII,S$GLB,, | Performed by: PHYSICIAN ASSISTANT

## 2025-06-05 PROCEDURE — 85025 COMPLETE CBC W/AUTO DIFF WBC: CPT

## 2025-06-05 PROCEDURE — 3078F DIAST BP <80 MM HG: CPT | Mod: CPTII,S$GLB,, | Performed by: PHYSICIAN ASSISTANT

## 2025-06-05 PROCEDURE — 82607 VITAMIN B-12: CPT

## 2025-06-05 PROCEDURE — 3044F HG A1C LEVEL LT 7.0%: CPT | Mod: CPTII,S$GLB,, | Performed by: PHYSICIAN ASSISTANT

## 2025-06-05 PROCEDURE — 99999 PR PBB SHADOW E&M-EST. PATIENT-LVL IV: CPT | Mod: PBBFAC,,, | Performed by: PHYSICIAN ASSISTANT

## 2025-06-05 PROCEDURE — 3074F SYST BP LT 130 MM HG: CPT | Mod: CPTII,S$GLB,, | Performed by: PHYSICIAN ASSISTANT

## 2025-06-05 PROCEDURE — 99214 OFFICE O/P EST MOD 30 MIN: CPT | Mod: S$GLB,,, | Performed by: PHYSICIAN ASSISTANT

## 2025-06-05 PROCEDURE — 36415 COLL VENOUS BLD VENIPUNCTURE: CPT

## 2025-06-05 PROCEDURE — 1160F RVW MEDS BY RX/DR IN RCRD: CPT | Mod: CPTII,S$GLB,, | Performed by: PHYSICIAN ASSISTANT

## 2025-06-05 PROCEDURE — 1159F MED LIST DOCD IN RCRD: CPT | Mod: CPTII,S$GLB,, | Performed by: PHYSICIAN ASSISTANT

## 2025-06-05 PROCEDURE — 82728 ASSAY OF FERRITIN: CPT

## 2025-08-06 ENCOUNTER — OFFICE VISIT (OUTPATIENT)
Dept: INTERNAL MEDICINE | Facility: CLINIC | Age: 40
End: 2025-08-06
Payer: COMMERCIAL

## 2025-08-06 ENCOUNTER — LAB VISIT (OUTPATIENT)
Dept: LAB | Facility: HOSPITAL | Age: 40
End: 2025-08-06
Payer: COMMERCIAL

## 2025-08-06 VITALS
WEIGHT: 154.75 LBS | SYSTOLIC BLOOD PRESSURE: 112 MMHG | OXYGEN SATURATION: 98 % | HEIGHT: 73 IN | BODY MASS INDEX: 20.51 KG/M2 | HEART RATE: 81 BPM | DIASTOLIC BLOOD PRESSURE: 60 MMHG

## 2025-08-06 DIAGNOSIS — Z11.3 ROUTINE SCREENING FOR STI (SEXUALLY TRANSMITTED INFECTION): Primary | ICD-10-CM

## 2025-08-06 DIAGNOSIS — R53.83 FATIGUE, UNSPECIFIED TYPE: ICD-10-CM

## 2025-08-06 DIAGNOSIS — Z11.3 ROUTINE SCREENING FOR STI (SEXUALLY TRANSMITTED INFECTION): ICD-10-CM

## 2025-08-06 DIAGNOSIS — L30.8 PRURITIC DERMATITIS: ICD-10-CM

## 2025-08-06 DIAGNOSIS — R73.03 PREDIABETES: ICD-10-CM

## 2025-08-06 LAB
EAG (OHS): 120 MG/DL (ref 68–131)
HBA1C MFR BLD: 5.8 % (ref 4–5.6)
T PALLIDUM IGG+IGM SER QL: NORMAL

## 2025-08-06 PROCEDURE — 3078F DIAST BP <80 MM HG: CPT | Mod: CPTII,S$GLB,, | Performed by: PHYSICIAN ASSISTANT

## 2025-08-06 PROCEDURE — 83036 HEMOGLOBIN GLYCOSYLATED A1C: CPT

## 2025-08-06 PROCEDURE — 1159F MED LIST DOCD IN RCRD: CPT | Mod: CPTII,S$GLB,, | Performed by: PHYSICIAN ASSISTANT

## 2025-08-06 PROCEDURE — 87389 HIV-1 AG W/HIV-1&-2 AB AG IA: CPT

## 2025-08-06 PROCEDURE — 3008F BODY MASS INDEX DOCD: CPT | Mod: CPTII,S$GLB,, | Performed by: PHYSICIAN ASSISTANT

## 2025-08-06 PROCEDURE — 87491 CHLMYD TRACH DNA AMP PROBE: CPT

## 2025-08-06 PROCEDURE — 99214 OFFICE O/P EST MOD 30 MIN: CPT | Mod: S$GLB,,, | Performed by: PHYSICIAN ASSISTANT

## 2025-08-06 PROCEDURE — 99999 PR PBB SHADOW E&M-EST. PATIENT-LVL III: CPT | Mod: PBBFAC,,, | Performed by: PHYSICIAN ASSISTANT

## 2025-08-06 PROCEDURE — 1160F RVW MEDS BY RX/DR IN RCRD: CPT | Mod: CPTII,S$GLB,, | Performed by: PHYSICIAN ASSISTANT

## 2025-08-06 PROCEDURE — 36415 COLL VENOUS BLD VENIPUNCTURE: CPT

## 2025-08-06 PROCEDURE — 86593 SYPHILIS TEST NON-TREP QUANT: CPT

## 2025-08-06 PROCEDURE — 3044F HG A1C LEVEL LT 7.0%: CPT | Mod: CPTII,S$GLB,, | Performed by: PHYSICIAN ASSISTANT

## 2025-08-06 PROCEDURE — 82306 VITAMIN D 25 HYDROXY: CPT

## 2025-08-06 PROCEDURE — 3074F SYST BP LT 130 MM HG: CPT | Mod: CPTII,S$GLB,, | Performed by: PHYSICIAN ASSISTANT

## 2025-08-06 RX ORDER — TRIAMCINOLONE ACETONIDE 1 MG/G
OINTMENT TOPICAL 2 TIMES DAILY
Qty: 30 G | Refills: 0 | Status: SHIPPED | OUTPATIENT
Start: 2025-08-06

## 2025-08-06 NOTE — PROGRESS NOTES
"Subjective     Patient ID: Nirmal Owens is a 40 y.o. male.    Chief Complaint: Sexual Problem    HPI    Established pt of No primary care provider on file.     Here for STI screening  No  s/s  Reports he recently had a "slip up"  No exposure concerns.       C/o chronic recurrent itchy rash to elbows, back of neck, saw allergy, prescribed TAC for Eczema but pt never tried  Hesitant about medication.s     PreDM: A1c has improved. Working on reducing carbs,  weight training more.     Prev A1c was 6.2 fructosamine checked given his sickle cell trait and it is consistent with A1c of approx 6.4 in March 2025    Past Medical History:   Diagnosis Date    Allergy     Asthma     Eczema     Prediabetes     Urticaria        Social History[1]    Review of patient's allergies indicates:  No Known Allergies      Review of Systems   Constitutional:  Negative for chills, fever and unexpected weight change.   Respiratory:  Negative for cough and shortness of breath.    Cardiovascular:  Negative for chest pain and leg swelling.   Gastrointestinal:  Negative for abdominal pain, nausea and vomiting.   Integumentary:  Positive for rash.   Neurological:  Negative for weakness and headaches.          Objective  /60 (BP Location: Left arm, Patient Position: Sitting)   Pulse 81   Ht 6' 1" (1.854 m)   Wt 70.2 kg (154 lb 12.2 oz)   SpO2 98%   BMI 20.42 kg/m²       Physical Exam  Vitals reviewed.   Constitutional:       General: He is not in acute distress.     Appearance: He is well-developed.   HENT:      Head: Normocephalic and atraumatic.   Pulmonary:      Effort: Pulmonary effort is normal. No respiratory distress.   Skin:     General: Skin is warm and dry.      Findings: Rash (hyperpigmented patch to elbows) present.   Neurological:      Mental Status: He is alert.            Assessment and Plan     1. Routine screening for STI (sexually transmitted infection)  -     C. trachomatis/N. gonorrhoeae by AMP DNA; Future; Expected " date: 08/06/2025  -     Treponema Pallidium Antibodies IgG, IgM; Future; Expected date: 08/06/2025  -     HIV 1/2 Ag/Ab (4th Gen); Future; Expected date: 08/06/2025    2. Pruritic dermatitis  -     triamcinolone acetonide 0.1% (KENALOG) 0.1 % ointment; Apply topically 2 (two) times daily.  Dispense: 30 g; Refill: 0    3. Prediabetes  Lab Results   Component Value Date    HGBA1C 5.8 (H) 06/05/2025   Improving, continue lifestyle mods        RTC prn    Annabel Hernadez PA-C       [1]   Social History  Tobacco Use    Smoking status: Never    Smokeless tobacco: Never   Substance Use Topics    Alcohol use: Yes     Alcohol/week: 0.0 standard drinks of alcohol     Comment: occasionally    Drug use: No

## 2025-08-07 LAB
25(OH)D3+25(OH)D2 SERPL-MCNC: 33 NG/ML (ref 30–96)
HIV 1+2 AB+HIV1 P24 AG SERPL QL IA: NORMAL

## 2025-08-08 ENCOUNTER — PATIENT MESSAGE (OUTPATIENT)
Dept: INTERNAL MEDICINE | Facility: CLINIC | Age: 40
End: 2025-08-08
Payer: COMMERCIAL
